# Patient Record
Sex: FEMALE | Race: WHITE | NOT HISPANIC OR LATINO | Employment: UNEMPLOYED | URBAN - METROPOLITAN AREA
[De-identification: names, ages, dates, MRNs, and addresses within clinical notes are randomized per-mention and may not be internally consistent; named-entity substitution may affect disease eponyms.]

---

## 2017-03-02 DIAGNOSIS — Z00.00 ENCOUNTER FOR ANNUAL PHYSICAL EXAM: Primary | ICD-10-CM

## 2017-03-03 LAB
ALBUMIN SERPL-MCNC: 4.1 G/DL (ref 3.5–5.2)
ALBUMIN/GLOB SERPL: 1.6 G/DL
ALP SERPL-CCNC: 35 U/L (ref 40–129)
ALT SERPL-CCNC: 20 U/L (ref 5–33)
AST SERPL-CCNC: 20 U/L (ref 5–32)
BASOPHILS # BLD AUTO: 0.02 10*3/MM3 (ref 0–0.2)
BASOPHILS NFR BLD AUTO: 0.4 % (ref 0–2)
BILIRUB SERPL-MCNC: 0.4 MG/DL (ref 0.2–1.2)
BUN SERPL-MCNC: 12 MG/DL (ref 6–20)
BUN/CREAT SERPL: 11.9 (ref 7–25)
CALCIUM SERPL-MCNC: 8.8 MG/DL (ref 8.6–10.5)
CHLORIDE SERPL-SCNC: 102 MMOL/L (ref 98–107)
CHOLEST SERPL-MCNC: 166 MG/DL (ref 0–200)
CO2 SERPL-SCNC: 24.6 MMOL/L (ref 22–29)
CREAT SERPL-MCNC: 1.01 MG/DL (ref 0.57–1)
EOSINOPHIL # BLD AUTO: 0.25 10*3/MM3 (ref 0.1–0.3)
EOSINOPHIL NFR BLD AUTO: 4.6 % (ref 0–4)
ERYTHROCYTE [DISTWIDTH] IN BLOOD BY AUTOMATED COUNT: 12.5 % (ref 11.5–14.5)
GLOBULIN SER CALC-MCNC: 2.5 GM/DL
GLUCOSE SERPL-MCNC: 88 MG/DL (ref 65–99)
HCT VFR BLD AUTO: 41.9 % (ref 37–47)
HDLC SERPL-MCNC: 58 MG/DL (ref 40–60)
HGB BLD-MCNC: 13.4 G/DL (ref 12–16)
IMM GRANULOCYTES # BLD: 0.01 10*3/MM3 (ref 0–0.03)
IMM GRANULOCYTES NFR BLD: 0.2 % (ref 0–0.5)
LDLC SERPL CALC-MCNC: 86 MG/DL (ref 0–100)
LDLC/HDLC SERPL: 1.48 {RATIO}
LYMPHOCYTES # BLD AUTO: 1.33 10*3/MM3 (ref 0.6–4.8)
LYMPHOCYTES NFR BLD AUTO: 24.4 % (ref 20–45)
MCH RBC QN AUTO: 30.2 PG (ref 27–31)
MCHC RBC AUTO-ENTMCNC: 32 G/DL (ref 31–37)
MCV RBC AUTO: 94.4 FL (ref 81–99)
MONOCYTES # BLD AUTO: 0.36 10*3/MM3 (ref 0–1)
MONOCYTES NFR BLD AUTO: 6.6 % (ref 3–8)
NEUTROPHILS # BLD AUTO: 3.48 10*3/MM3 (ref 1.5–8.3)
NEUTROPHILS NFR BLD AUTO: 63.8 % (ref 45–70)
NRBC BLD AUTO-RTO: 0 /100 WBC (ref 0–0)
PLATELET # BLD AUTO: 250 10*3/MM3 (ref 140–500)
POTASSIUM SERPL-SCNC: 4.5 MMOL/L (ref 3.5–5.2)
PROT SERPL-MCNC: 6.6 G/DL (ref 6–8.5)
RBC # BLD AUTO: 4.44 10*6/MM3 (ref 4.2–5.4)
SODIUM SERPL-SCNC: 139 MMOL/L (ref 136–145)
TRIGL SERPL-MCNC: 111 MG/DL (ref 0–150)
VLDLC SERPL CALC-MCNC: 22.2 MG/DL (ref 7–27)
WBC # BLD AUTO: 5.45 10*3/MM3 (ref 4.8–10.8)

## 2017-03-10 ENCOUNTER — OFFICE VISIT (OUTPATIENT)
Dept: FAMILY MEDICINE CLINIC | Facility: CLINIC | Age: 52
End: 2017-03-10

## 2017-03-10 VITALS
RESPIRATION RATE: 16 BRPM | WEIGHT: 142 LBS | SYSTOLIC BLOOD PRESSURE: 100 MMHG | DIASTOLIC BLOOD PRESSURE: 58 MMHG | TEMPERATURE: 98.5 F | HEART RATE: 81 BPM | OXYGEN SATURATION: 99 % | HEIGHT: 63 IN | BODY MASS INDEX: 25.16 KG/M2

## 2017-03-10 DIAGNOSIS — Z00.00 ANNUAL PHYSICAL EXAM: Primary | ICD-10-CM

## 2017-03-10 PROBLEM — J30.89 PERENNIAL ALLERGIC RHINITIS: Status: ACTIVE | Noted: 2017-03-10

## 2017-03-10 PROCEDURE — 99396 PREV VISIT EST AGE 40-64: CPT | Performed by: PHYSICIAN ASSISTANT

## 2017-03-10 NOTE — PROGRESS NOTES
Subjective   Dina Roche is a 51 y.o. female.   Chief Complaint   Patient presents with   • Annual Exam     Lab review       History of Present Illness     Dina is a 51-year-old female who presents for annual physical examination.  States she is feeling well and has no complaints at today's office visit.  She sees OB/GYN for her gynecological and mammogram needs. Dina any upper respiratory symptoms, chest pain, shortness of air, dizziness, abdominal pain, vision changes, dysuria, or swelling of her ankles.  Bowel movements are daily without dark black tarry stools.  Appetite has been healthy and sleep has been normal.  She does exercise by walking daily.    The following portions of the patient's history were reviewed and updated as appropriate: allergies, current medications, past family history, past medical history, past social history and past surgical history.    Review of Systems   Constitutional: Negative.  Negative for chills, fatigue and fever.   HENT: Negative.  Negative for congestion, dental problem, ear pain, postnasal drip, sinus pressure, sneezing and sore throat.    Eyes: Negative.    Respiratory: Negative.  Negative for cough, shortness of breath and wheezing.    Cardiovascular: Negative.  Negative for chest pain, palpitations and leg swelling.   Gastrointestinal: Negative.  Negative for blood in stool, constipation, diarrhea, nausea and vomiting.   Endocrine: Negative.    Genitourinary: Negative.  Negative for dysuria, frequency, hematuria and urgency.   Musculoskeletal: Negative.    Skin: Negative.    Allergic/Immunologic: Negative.    Neurological: Negative.  Negative for dizziness, light-headedness and headaches.   Hematological: Negative.    Psychiatric/Behavioral: Negative.  Negative for sleep disturbance.   All other systems reviewed and are negative.    Vitals:    03/10/17 1452   BP: 100/58   BP Location: Right arm   Patient Position: Sitting   Cuff Size: Adult   Pulse: 81  "  Resp: 16   Temp: 98.5 °F (36.9 °C)   TempSrc: Oral   SpO2: 99%   Weight: 142 lb (64.4 kg)   Height: 62.5\" (158.8 cm)     Wt Readings from Last 3 Encounters:   03/10/17 142 lb (64.4 kg)   03/09/16 142 lb 14.4 oz (64.8 kg)   02/09/15 139 lb 15.9 oz (63.5 kg)       BP Readings from Last 3 Encounters:   03/10/17 100/58   03/09/16 110/62   02/09/15 110/60     Body mass index is 25.56 kg/(m^2).    No Known Allergies    Objective   Physical Exam   Constitutional: She is oriented to person, place, and time. Vital signs are normal. She appears well-developed and well-nourished.   HENT:   Head: Normocephalic and atraumatic.   Right Ear: Hearing, tympanic membrane, external ear and ear canal normal.   Left Ear: Hearing, tympanic membrane, external ear and ear canal normal.   Nose: Nose normal. Right sinus exhibits no maxillary sinus tenderness and no frontal sinus tenderness. Left sinus exhibits no maxillary sinus tenderness and no frontal sinus tenderness.   Mouth/Throat: Uvula is midline, oropharynx is clear and moist and mucous membranes are normal.   Eyes: Conjunctivae, EOM and lids are normal. Pupils are equal, round, and reactive to light.   Neck: Trachea normal and phonation normal. Neck supple. No edema present. No thyromegaly present.   Cardiovascular: Normal rate, regular rhythm, S1 normal, S2 normal, normal heart sounds and normal pulses.    Pulmonary/Chest: Effort normal and breath sounds normal.   Abdominal: Soft. Normal appearance, normal aorta and bowel sounds are normal. There is no hepatomegaly. There is no tenderness.   Lymphadenopathy:     She has no cervical adenopathy.   Neurological: She is alert and oriented to person, place, and time.   Reflex Scores:       Patellar reflexes are 2+ on the right side and 2+ on the left side.  Skin: Skin is warm, dry and intact.   Psychiatric: She has a normal mood and affect. Her speech is normal and behavior is normal. Judgment and thought content normal. Cognition " and memory are normal.       Assessment/Plan   Dina was seen today for annual exam.    Diagnoses and all orders for this visit:    Annual physical exam      Mrs. Dina Roche was seen in office today for annual physical examination.  I have reviewed her laboratory results with her and have completed her biometric storm.  She is a healthy young lady with a normal physical exam at today's office visit.  I've encouraged her to continue eating healthy and exercising regularly.  She will schedule follow-up appointment in one year.        Orders Only on 03/02/2017   Component Date Value Ref Range Status   • WBC 03/03/2017 5.45  4.80 - 10.80 10*3/mm3 Final   • RBC 03/03/2017 4.44  4.20 - 5.40 10*6/mm3 Final   • Hemoglobin 03/03/2017 13.4  12.0 - 16.0 g/dL Final   • Hematocrit 03/03/2017 41.9  37.0 - 47.0 % Final   • MCV 03/03/2017 94.4  81.0 - 99.0 fL Final   • MCH 03/03/2017 30.2  27.0 - 31.0 pg Final   • MCHC 03/03/2017 32.0  31.0 - 37.0 g/dL Final   • RDW 03/03/2017 12.5  11.5 - 14.5 % Final   • Platelets 03/03/2017 250  140 - 500 10*3/mm3 Final   • Neutrophil Rel % 03/03/2017 63.8  45.0 - 70.0 % Final   • Lymphocyte Rel % 03/03/2017 24.4  20.0 - 45.0 % Final   • Monocyte Rel % 03/03/2017 6.6  3.0 - 8.0 % Final   • Eosinophil Rel % 03/03/2017 4.6* 0.0 - 4.0 % Final   • Basophil Rel % 03/03/2017 0.4  0.0 - 2.0 % Final   • Neutrophils Absolute 03/03/2017 3.48  1.50 - 8.30 10*3/mm3 Final   • Lymphocytes Absolute 03/03/2017 1.33  0.60 - 4.80 10*3/mm3 Final   • Monocytes Absolute 03/03/2017 0.36  0.00 - 1.00 10*3/mm3 Final   • Eosinophils Absolute 03/03/2017 0.25  0.10 - 0.30 10*3/mm3 Final   • Basophils Absolute 03/03/2017 0.02  0.00 - 0.20 10*3/mm3 Final   • Immature Granulocyte Rel % 03/03/2017 0.2  0.0 - 0.5 % Final   • Immature Grans Absolute 03/03/2017 0.01  0.00 - 0.03 10*3/mm3 Final   • nRBC 03/03/2017 0.0  0.0 - 0.0 /100 WBC Final   • Glucose 03/03/2017 88  65 - 99 mg/dL Final   • BUN 03/03/2017 12  6 - 20  mg/dL Final   • Creatinine 03/03/2017 1.01* 0.57 - 1.00 mg/dL Final   • eGFR Non African Am 03/03/2017 58* >60 mL/min/1.73 Final   • eGFR African Am 03/03/2017 71  >60 mL/min/1.73 Final   • BUN/Creatinine Ratio 03/03/2017 11.9  7.0 - 25.0 Final   • Sodium 03/03/2017 139  136 - 145 mmol/L Final   • Potassium 03/03/2017 4.5  3.5 - 5.2 mmol/L Final   • Chloride 03/03/2017 102  98 - 107 mmol/L Final   • Total CO2 03/03/2017 24.6  22.0 - 29.0 mmol/L Final   • Calcium 03/03/2017 8.8  8.6 - 10.5 mg/dL Final   • Total Protein 03/03/2017 6.6  6.0 - 8.5 g/dL Final   • Albumin 03/03/2017 4.10  3.50 - 5.20 g/dL Final   • Globulin 03/03/2017 2.5  gm/dL Final   • A/G Ratio 03/03/2017 1.6  g/dL Final   • Total Bilirubin 03/03/2017 0.4  0.2 - 1.2 mg/dL Final   • Alkaline Phosphatase 03/03/2017 35* 40 - 129 U/L Final   • AST (SGOT) 03/03/2017 20  5 - 32 U/L Final   • ALT (SGPT) 03/03/2017 20  5 - 33 U/L Final   • Total Cholesterol 03/03/2017 166  0 - 200 mg/dL Final   • Triglycerides 03/03/2017 111  0 - 150 mg/dL Final   • HDL Cholesterol 03/03/2017 58  40 - 60 mg/dL Final   • VLDL Cholesterol 03/03/2017 22.2  7 - 27 mg/dL Final   • LDL Cholesterol  03/03/2017 86  0 - 100 mg/dL Final   • LDL/HDL Ratio 03/03/2017 1.48   Final           Dragon transcription disclaimer    Much of this encounter note is an electronic transcription/translation of spoken language to printed text.  The electronic translation of spoken language may permit erroneous, or at times, nonsensical words or phrases to be inadvertently transcribed.  Although I have reviewed the note for such errors, some may still exist.    Catie Leiva PA-C  Family Practice

## 2017-04-18 ENCOUNTER — APPOINTMENT (OUTPATIENT)
Dept: WOMENS IMAGING | Facility: HOSPITAL | Age: 52
End: 2017-04-18

## 2017-04-18 PROCEDURE — 77067 SCR MAMMO BI INCL CAD: CPT | Performed by: RADIOLOGY

## 2017-04-18 PROCEDURE — 77063 BREAST TOMOSYNTHESIS BI: CPT | Performed by: RADIOLOGY

## 2017-09-27 ENCOUNTER — OFFICE VISIT (OUTPATIENT)
Dept: FAMILY MEDICINE CLINIC | Facility: CLINIC | Age: 52
End: 2017-09-27

## 2017-09-27 VITALS
HEART RATE: 83 BPM | WEIGHT: 144 LBS | BODY MASS INDEX: 25.52 KG/M2 | RESPIRATION RATE: 16 BRPM | SYSTOLIC BLOOD PRESSURE: 120 MMHG | DIASTOLIC BLOOD PRESSURE: 72 MMHG | TEMPERATURE: 98.3 F | HEIGHT: 63 IN | OXYGEN SATURATION: 100 %

## 2017-09-27 DIAGNOSIS — M54.50 ACUTE MIDLINE LOW BACK PAIN WITHOUT SCIATICA: Primary | ICD-10-CM

## 2017-09-27 DIAGNOSIS — Z23 NEED FOR IMMUNIZATION AGAINST INFLUENZA: ICD-10-CM

## 2017-09-27 PROCEDURE — 90686 IIV4 VACC NO PRSV 0.5 ML IM: CPT | Performed by: PHYSICIAN ASSISTANT

## 2017-09-27 PROCEDURE — 99213 OFFICE O/P EST LOW 20 MIN: CPT | Performed by: PHYSICIAN ASSISTANT

## 2017-09-27 RX ORDER — METHYLPREDNISOLONE 4 MG/1
TABLET ORAL
Qty: 21 TABLET | Refills: 0 | Status: SHIPPED | OUTPATIENT
Start: 2017-09-27 | End: 2017-11-15 | Stop reason: SDUPTHER

## 2017-09-27 RX ORDER — METHOCARBAMOL 500 MG/1
500 TABLET, FILM COATED ORAL 4 TIMES DAILY
Qty: 60 TABLET | Refills: 0 | Status: SHIPPED | OUTPATIENT
Start: 2017-09-27 | End: 2018-12-21

## 2017-09-27 RX ORDER — MELOXICAM 15 MG/1
15 TABLET ORAL DAILY
Qty: 30 TABLET | Refills: 0 | Status: SHIPPED | OUTPATIENT
Start: 2017-09-27 | End: 2018-12-21

## 2017-09-27 NOTE — PROGRESS NOTES
"Subjective   Dina Roche is a 52 y.o. female.   Chief Complaint   Patient presents with   • Back Pain       History of Present Illness     Dina is a 52-year-old female who presents with low back pain for the past 3 days.  States she was turning in the kitchen on Sunday.  States she \"felt a pop.snap sensation\" in her lower back area.  The pain worsens with movement.  States she has had back issues in the past.  Dina has slight scoliosis in her lower spine.  She has injured her back in the past when she slipped once and fell.  She has used OTC salon pas,IBU and heating pad with no relief.  She applied ICE yesterday which helped.  Bowel movements are every other day. Denied any dark tarry stools.  Denied any stool/urine incontinence. Dina has taken OTC probiotic daily.  Sleep has been restless due to back pain.  She has been sleeping with a pillow between legs.  Healthy diet.  Dina would like the flu shot at today's office visit.      The following portions of the patient's history were reviewed and updated as appropriate: allergies, current medications, past family history, past medical history, past social history and past surgical history.    Review of Systems   Constitutional: Negative.    HENT: Negative.    Eyes: Negative.    Respiratory: Negative.  Negative for cough, shortness of breath and wheezing.    Cardiovascular: Negative.  Negative for chest pain, palpitations and leg swelling.   Gastrointestinal: Negative.  Negative for abdominal pain, constipation, diarrhea, nausea and vomiting.   Endocrine: Negative.    Genitourinary: Negative.    Musculoskeletal: Positive for back pain.   Skin: Negative.    Allergic/Immunologic: Negative.    Neurological: Negative.    Hematological: Negative.    Psychiatric/Behavioral: Positive for sleep disturbance. Negative for suicidal ideas.   All other systems reviewed and are negative.    Vitals:    09/27/17 1031   BP: 120/72   BP Location: Left arm " "  Patient Position: Sitting   Cuff Size: Adult   Pulse: 83   Resp: 16   Temp: 98.3 °F (36.8 °C)   TempSrc: Oral   SpO2: 100%   Weight: 144 lb (65.3 kg)   Height: 62.5\" (158.8 cm)       Wt Readings from Last 3 Encounters:   09/27/17 144 lb (65.3 kg)   03/10/17 142 lb (64.4 kg)   03/09/16 142 lb 14.4 oz (64.8 kg)       BP Readings from Last 3 Encounters:   09/27/17 120/72   03/10/17 100/58   03/09/16 110/62     Body mass index is 25.92 kg/(m^2).  No Known Allergies    Objective   Physical Exam   Constitutional: She is oriented to person, place, and time. Vital signs are normal. She appears well-developed and well-nourished.   Neck: Trachea normal and phonation normal. Neck supple. No edema present.   Cardiovascular: Normal rate, regular rhythm, S1 normal, S2 normal, normal heart sounds and normal pulses.    Pulmonary/Chest: Effort normal and breath sounds normal.   Abdominal: Soft. Normal appearance and bowel sounds are normal. There is no hepatomegaly. There is no tenderness.   Musculoskeletal:        Lumbar back: She exhibits decreased range of motion, tenderness, pain and spasm. She exhibits no bony tenderness and normal pulse.        Back:    Low back:  TTP along bilateral lumbar spinous muscles.  Decreased ROM in all fields.  Good heel/toe walk,5/5 B/L muscle strength,2+ distal pulses,2+DTR and negative SLR noted.   Neurological: She is alert and oriented to person, place, and time. She has normal strength. No sensory deficit.   Reflex Scores:       Patellar reflexes are 2+ on the right side and 2+ on the left side.  Skin: Skin is warm, dry and intact.   Psychiatric: She has a normal mood and affect. Her speech is normal and behavior is normal. Judgment and thought content normal. Cognition and memory are normal.       Assessment/Plan   Dina was seen today for back pain.    Diagnoses and all orders for this visit:    Acute midline low back pain without sciatica  -     methocarbamol (ROBAXIN) 500 MG tablet; " Take 1 tablet by mouth 4 (Four) Times a Day.  -     MethylPREDNISolone (MEDROL, LENA,) 4 MG tablet; Take as directed on package instructions.  -     meloxicam (MOBIC) 15 MG tablet; Take 1 tablet by mouth Daily.    Need for immunization against influenza  -     Flu Vaccine Quad PF 3YR+      1.  New acute midline low back pain without sciatica: I have prescribed Robaxin,Mobic and a Medrol Dosepak to pharmacy.  I have encouraged Dina to continue ice treatment at home as needed.  I have given her exercises to do daily.  I've encouraged her to continue sleeping with a pillow between legs. Dina return to office if no improvement.  I'll consider imaging studies and physical therapy in future if no improvement.  2.  Need for influenza immunization: She has given written consent and will receive the flu shot at today's office visit.        Dragon transcription disclaimer    Much of this encounter note is an electronic transcription/translation of spoken language to printed text.  The electronic translation of spoken language may permit erroneous, or at times, nonsensical words or phrases to be inadvertently transcribed.  Although I have reviewed the note for such errors, some may still exist.    Catie Leiva PA-C  Family Practice

## 2017-09-27 NOTE — PATIENT INSTRUCTIONS
Back Exercises  If you have pain in your back, do these exercises 2-3 times each day or as told by your doctor. When the pain goes away, do the exercises once each day, but repeat the steps more times for each exercise (do more repetitions). If you do not have pain in your back, do these exercises once each day or as told by your doctor.  EXERCISES  Single Knee to Chest  Do these steps 3-5 times in a row for each le. Lie on your back on a firm bed or the floor with your legs stretched out.  2. Bring one knee to your chest.  3. Hold your knee to your chest by grabbing your knee or thigh.  4. Pull on your knee until you feel a gentle stretch in your lower back.  5. Keep doing the stretch for 10-30 seconds.  6. Slowly let go of your leg and straighten it.  Pelvic Tilt  Do these steps 5-10 times in a row:  1. Lie on your back on a firm bed or the floor with your legs stretched out.  2. Bend your knees so they point up to the ceiling. Your feet should be flat on the floor.  3. Tighten your lower belly (abdomen) muscles to press your lower back against the floor. This will make your tailbone point up to the ceiling instead of pointing down to your feet or the floor.  4. Stay in this position for 5-10 seconds while you gently tighten your muscles and breathe evenly.  Cat-Cow  Do these steps until your lower back bends more easily:  1. Get on your hands and knees on a firm surface. Keep your hands under your shoulders, and keep your knees under your hips. You may put padding under your knees.  2. Let your head hang down, and make your tailbone point down to the floor so your lower back is round like the back of a cat.  3. Stay in this position for 5 seconds.  4. Slowly lift your head and make your tailbone point up to the ceiling so your back hangs low (sags) like the back of a cow.  5. Stay in this position for 5 seconds.  Press-Ups  Do these steps 5-10 times in a row:  1. Lie on your belly (face-down) on the  floor.  2. Place your hands near your head, about shoulder-width apart.  3. While you keep your back relaxed and keep your hips on the floor, slowly straighten your arms to raise the top half of your body and lift your shoulders. Do not use your back muscles. To make yourself more comfortable, you may change where you place your hands.  4. Stay in this position for 5 seconds.  5. Slowly return to lying flat on the floor.  Bridges  Do these steps 10 times in a row:  1. Lie on your back on a firm surface.  2. Bend your knees so they point up to the ceiling. Your feet should be flat on the floor.  3. Tighten your butt muscles and lift your butt off of the floor until your waist is almost as high as your knees. If you do not feel the muscles working in your butt and the back of your thighs, slide your feet 1-2 inches farther away from your butt.  4. Stay in this position for 3-5 seconds.  5. Slowly lower your butt to the floor, and let your butt muscles relax.  If this exercise is too easy, try doing it with your arms crossed over your chest.  Belly Crunches  Do these steps 5-10 times in a row:  1. Lie on your back on a firm bed or the floor with your legs stretched out.  2. Bend your knees so they point up to the ceiling. Your feet should be flat on the floor.  3. Cross your arms over your chest.  4. Tip your chin a little bit toward your chest but do not bend your neck.  5. Tighten your belly muscles and slowly raise your chest just enough to lift your shoulder blades a tiny bit off of the floor.  6. Slowly lower your chest and your head to the floor.  Back Lifts  Do these steps 5-10 times in a row:  1. Lie on your belly (face-down) with your arms at your sides, and rest your forehead on the floor.  2. Tighten the muscles in your legs and your butt.  3. Slowly lift your chest off of the floor while you keep your hips on the floor. Keep the back of your head in line with the curve in your back. Look at the floor while  you do this.  4. Stay in this position for 3-5 seconds.  5. Slowly lower your chest and your face to the floor.  GET HELP IF:  · Your back pain gets a lot worse when you do an exercise.  · Your back pain does not lessen 2 hours after you exercise.  If you have any of these problems, stop doing the exercises. Do not do them again unless your doctor says it is okay.  GET HELP RIGHT AWAY IF:  · You have sudden, very bad back pain. If this happens, stop doing the exercises. Do not do them again unless your doctor says it is okay.     This information is not intended to replace advice given to you by your health care provider. Make sure you discuss any questions you have with your health care provider.     Document Released: 01/20/2012 Document Revised: 04/10/2017 Document Reviewed: 02/11/2016  [employer]vier Interactive Patient Education ©2017 [employer]

## 2017-11-15 ENCOUNTER — TELEPHONE (OUTPATIENT)
Dept: FAMILY MEDICINE CLINIC | Facility: CLINIC | Age: 52
End: 2017-11-15

## 2017-11-15 DIAGNOSIS — M54.50 ACUTE MIDLINE LOW BACK PAIN WITHOUT SCIATICA: ICD-10-CM

## 2017-11-15 RX ORDER — METHYLPREDNISOLONE 4 MG/1
TABLET ORAL
Qty: 21 TABLET | Refills: 0 | Status: SHIPPED | OUTPATIENT
Start: 2017-11-15 | End: 2018-12-21

## 2017-11-15 NOTE — TELEPHONE ENCOUNTER
Notify  patient have sent a Medrol Dosepak to pharmacy.  Please keep appointment for dry needling.  If no improvement please schedule follow-up appointment.

## 2017-11-16 ENCOUNTER — TREATMENT (OUTPATIENT)
Dept: PHYSICAL THERAPY | Facility: CLINIC | Age: 52
End: 2017-11-16

## 2017-11-16 PROCEDURE — DRYNDL PR CUSTOM DRY NEEDLING SELF PAY: Performed by: PHYSICAL THERAPIST

## 2017-11-16 NOTE — PROGRESS NOTES
Physical Therapy Initial Evaluation and Plan of Care        Patient: Dina Roche   : 1965  Diagnosis/ICD-10 Code:  No primary diagnosis found.  Referring practitioner: No ref. provider found  Date of Initial Visit: 2017  Today's Date: 2017  Patient seen for 1 sessions                 Subjective Evaluation    History of Present Illness  Date of onset: 2017  Mechanism of injury: Occasional low back soreness - twisted to reach and felt a snap in my low back and went to [facility]- medrol DP, muscle relaxor, and meloxicam - significantly better within a couple days - came back 10/10/17 started back on muscle relaxor and NSAIDS have not helped - prescribed medrol DP yesterday       Patient Occupation:   Quality of life: excellent    Pain  Current pain ratin  At best pain ratin  At worst pain ratin  Location: center to right side low back   Quality: dull ache and sharp (nagging)  Relieving factors: ice and heat  Aggravating factors: stairs, ambulation and movement  Progression: no change    Social Support  Lives in: multiple-level home    Treatments  Previous treatment: medication  Current treatment: medication and physical therapy  Patient Goals  Patient goals for therapy: decreased pain, improved balance, increased motion, increased strength, independence with ADLs/IADLs, return to sport/leisure activities and return to work             Objective     Special Questions      Additional Special Questions  + cough/ sneeze      Postural Observations    Additional Postural Observation Details  = Iliac crest and ASIS in supine    Palpation     Right Tenderness of the quadratus lumborum.     Tenderness     Right Hip   Tenderness in the PSIS.     Neurological Testing   Sensation     Lumbar   Left   Intact: light touch    Right   Intact: light touch    Active Range of Motion     Lumbar   Flexion: 50 degrees with pain  Extension: 25 degrees with pain  Left lateral flexion: 75 degrees    Right lateral flexion: 50 degrees with pain  Left rotation: 75 degrees   Right rotation: 50 degrees with pain    Strength/Myotome Testing     Left Hip   Planes of Motion   Flexion: 5  Abduction: 5    Right Hip   Planes of Motion   Flexion: 5  Abduction: 5    Left Knee   Flexion: 5  Extension: 5    Right Knee   Flexion: 5  Extension: 5    Left Ankle/Foot   Dorsiflexion: 5  Plantar flexion: 5    Right Ankle/Foot   Dorsiflexion: 5  Plantar flexion: 5    Tests       Thoracic   Negative slump.     Lumbar     Left   Negative crossed SLR and passive SLR.     Right   Positive crossed SLR.   Negative passive SLR.     Additional Tests Details  Negative : B ZEFERINO         Assessment & Plan     Assessment  Assessment details: Informed and signed consent obtained for Dry Needling. Educated patient on purpose and procedure. Improved subjective reports and muscle tone after Dry Needling attempts. Cont PT 1x per week or as indicated by patient for Dry Needling.         Manual Therapy:         mins  87305;  Therapeutic Exercise:         mins  65653;     Neuromuscular Gabe:        mins  74108;    Therapeutic Activity:          mins  24584;     Gait Training:          mins  41232;     Ultrasound:      mins  42425;    Electrical Stimulation:         mins  47873 ( );  Dry Needling     15     mins self-pay    Timed Treatment:      mins   Total Treatment:     25   mins    PT SIGNATURE: Marce Robertson, PT   DATE TREATMENT INITIATED: 11/16/2017    Initial Certification  Certification Period: 2/14/2018  I certify that the therapy services are furnished while this patient is under my care.  The services outlined above are required by this patient, and will be reviewed every 90 days.     PHYSICIAN:       DATE:     Please sign and return via fax to [phone].. Thank you, [facility].

## 2018-04-24 ENCOUNTER — APPOINTMENT (OUTPATIENT)
Dept: WOMENS IMAGING | Facility: HOSPITAL | Age: 53
End: 2018-04-24

## 2018-04-24 PROCEDURE — 77063 BREAST TOMOSYNTHESIS BI: CPT | Performed by: RADIOLOGY

## 2018-04-24 PROCEDURE — 77067 SCR MAMMO BI INCL CAD: CPT | Performed by: RADIOLOGY

## 2018-05-11 ENCOUNTER — CLINICAL SUPPORT (OUTPATIENT)
Dept: FAMILY MEDICINE CLINIC | Facility: CLINIC | Age: 53
End: 2018-05-11

## 2018-05-11 DIAGNOSIS — Z23 NEED FOR DIPHTHERIA, TETANUS, ACELLULAR PERTUSSIS, HAEMOPHILUS INFLUENZAE, AND HEPATITIS B VIRUS VACCINE: Primary | ICD-10-CM

## 2018-05-11 PROCEDURE — 90632 HEPA VACCINE ADULT IM: CPT | Performed by: PHYSICIAN ASSISTANT

## 2018-05-11 PROCEDURE — 90471 IMMUNIZATION ADMIN: CPT | Performed by: PHYSICIAN ASSISTANT

## 2018-09-19 ENCOUNTER — APPOINTMENT (OUTPATIENT)
Dept: WOMENS IMAGING | Facility: HOSPITAL | Age: 53
End: 2018-09-19

## 2018-09-19 PROCEDURE — 76641 ULTRASOUND BREAST COMPLETE: CPT | Performed by: RADIOLOGY

## 2018-09-19 PROCEDURE — G0279 TOMOSYNTHESIS, MAMMO: HCPCS | Performed by: RADIOLOGY

## 2018-09-19 PROCEDURE — 77065 DX MAMMO INCL CAD UNI: CPT | Performed by: RADIOLOGY

## 2018-09-19 PROCEDURE — 77061 BREAST TOMOSYNTHESIS UNI: CPT | Performed by: RADIOLOGY

## 2018-10-08 ENCOUNTER — FLU SHOT (OUTPATIENT)
Dept: FAMILY MEDICINE CLINIC | Facility: CLINIC | Age: 53
End: 2018-10-08

## 2018-10-08 DIAGNOSIS — Z23 FLU VACCINE NEED: ICD-10-CM

## 2018-10-08 PROCEDURE — 90471 IMMUNIZATION ADMIN: CPT | Performed by: PHYSICIAN ASSISTANT

## 2018-10-08 PROCEDURE — 90674 CCIIV4 VAC NO PRSV 0.5 ML IM: CPT | Performed by: PHYSICIAN ASSISTANT

## 2018-11-12 ENCOUNTER — TREATMENT (OUTPATIENT)
Dept: PHYSICAL THERAPY | Facility: CLINIC | Age: 53
End: 2018-11-12

## 2018-11-12 DIAGNOSIS — M25.521 RIGHT ELBOW PAIN: Primary | ICD-10-CM

## 2018-11-12 PROCEDURE — DRYNDL PR CUSTOM DRY NEEDLING SELF PAY: Performed by: PHYSICAL THERAPIST

## 2018-11-12 NOTE — PROGRESS NOTES
Physical Therapy Initial Evaluation and Plan of Care    Patient: Dina Roche   : 1965  Diagnosis/ICD-10 Code:  Right elbow pain [M25.521]  Referring practitioner: No ref. provider found  Date of Initial Visit: 2018  Today's Date: 2018  Patient seen for 1 sessions                 Subjective Evaluation    History of Present Illness  Date of onset: 2018  Mechanism of injury: Stained deck and work 2 days a week using computer - always increases pain       Patient Occupation: desk work  Quality of life: excellent    Pain  Current pain rating: 3  At best pain ratin  At worst pain ratin  Location: right elbow   Quality: dull ache and discomfort  Relieving factors: rest and medications (ibuprofen)  Aggravating factors: lifting, movement, prolonged positioning, repetitive movement and overhead activity  Progression: worsening    Hand dominance: right    Diagnostic Tests  No diagnostic tests performed    Treatments  Previous treatment: medication  Current treatment: physical therapy  Patient Goals  Patient goals for therapy: decreased edema, decreased pain, increased motion, independence with ADLs/IADLs, return to sport/leisure activities and return to work             Objective       Palpation     Right   Hypertonic in the brachialis, brachioradialis, supinator, triceps, wrist extensors and wrist flexors.          Assessment & Plan     Assessment  Assessment details: Informed and signed consent for Dry Needling obtained. Patient educated in purpose and procedure. Requested patient to apply moist heat again tonight. Improved subjective reports, mobility, and muscle guarding after Dry Needling attempts. Cont PT 1x per week or as indicated by patient for Dry Needling.           Manual Therapy:         mins  21419;  Therapeutic Exercise:         mins  14896;     Neuromuscular Gabe:       mins  67584;    Therapeutic Activity:          mins  96953;     Gait Training:           mins  18306;      Ultrasound:          mins  57555;    Electrical Stimulation:         mins  99071 ( );  Dry Needling     15     mins self-pay    Timed Treatment:      mins   Total Treatment:     25   mins    PT SIGNATURE: Marce Robertson, PT   DATE TREATMENT INITIATED: 11/12/2018    Initial Certification  Certification Period: 2/10/2019  I certify that the therapy services are furnished while this patient is under my care.  The services outlined above are required by this patient, and will be reviewed every 90 days.     PHYSICIAN:       DATE:     Please sign and return via fax to [phone].. Thank you, [facility].

## 2018-11-19 ENCOUNTER — TREATMENT (OUTPATIENT)
Dept: PHYSICAL THERAPY | Facility: CLINIC | Age: 53
End: 2018-11-19

## 2018-11-19 DIAGNOSIS — M25.521 RIGHT ELBOW PAIN: Primary | ICD-10-CM

## 2018-11-19 PROCEDURE — DRYNDL PR CUSTOM DRY NEEDLING SELF PAY: Performed by: PHYSICAL THERAPIST

## 2018-11-19 NOTE — PROGRESS NOTES
Physical Therapy Daily Progress Note        Visit # : 2  Dina Roche reports: Elbow did feel like it got worse before it got better     Subjective     Objective       Palpation     Right   Hypertonic in the brachialis, brachioradialis, supinator, triceps, wrist extensors and wrist flexors.      See Exercise, Manual, and Modality Logs for complete treatment.       Assessment & Plan     Assessment  Assessment details: Patient presents with increased muscle tone right lateral and medial epi. Tolerated Dry Needling well today. Improved mobility and subjective reports after Dry Needling. Educated patient to apply moist heat again tonight.           Progress per Plan of Care           Manual Therapy:         mins  12612;  Therapeutic Exercise:        mins  01593;     Neuromuscular Gabe:        mins  15937;    Therapeutic Activity:          mins  14729;     Gait Training:          mins  17453;     Ultrasound:          mins  62482;    Electrical Stimulation:         mins  93557 ( );  Dry Needling     15     mins self-pay    Timed Treatment:      mins   Total Treatment:     25   mins    Marce Robertson, PT  Physical Therapist  [place] License # [id number]

## 2018-11-29 ENCOUNTER — TREATMENT (OUTPATIENT)
Dept: PHYSICAL THERAPY | Facility: CLINIC | Age: 53
End: 2018-11-29

## 2018-11-29 DIAGNOSIS — M25.521 RIGHT ELBOW PAIN: Primary | ICD-10-CM

## 2018-11-29 PROCEDURE — DRYNDL PR CUSTOM DRY NEEDLING SELF PAY: Performed by: PHYSICAL THERAPIST

## 2018-11-29 NOTE — PROGRESS NOTES
Physical Therapy Daily Progress Note        Visit # : 3  Dina Roche reports: I have had a lot more pain and stiffness over the past couple of days    Subjective     Objective       Palpation     Right   Hypertonic in the brachialis, pronator teres and wrist flexors.     Tests     Left Elbow   Positive moving valgus stress, passive medial epicondylitis and valgus stress at 30 degrees.      See Exercise, Manual, and Modality Logs for complete treatment.       Assessment & Plan     Assessment  Assessment details: Patient presents with increased subjective reports of pain and increased muscle tone right medial epi. Tolerated Dry Needling well today. Improved mobility and subjective reports after Dry Needling. Educated patient to apply moist heat again tonight. Patient to call Ortho if no improvement after today's treatment.           Progress per Plan of Care           Manual Therapy:         mins  19103;  Therapeutic Exercise:         mins  06010;     Neuromuscular Gabe:        mins  18116;    Therapeutic Activity:         mins  27530;     Gait Training:           mins  54236;     Ultrasound:          mins  22434;    Electrical Stimulation:         mins  60023 ( );  Dry Needling     15     mins self-pay    Timed Treatment:      mins   Total Treatment:     25   mins    Marce Robertson PT  Physical Therapist  [place] License # [id number]

## 2018-12-21 ENCOUNTER — OFFICE VISIT (OUTPATIENT)
Dept: ORTHOPEDIC SURGERY | Facility: CLINIC | Age: 53
End: 2018-12-21

## 2018-12-21 VITALS — HEIGHT: 63 IN | WEIGHT: 140 LBS | BODY MASS INDEX: 24.8 KG/M2 | TEMPERATURE: 98.3 F

## 2018-12-21 DIAGNOSIS — M77.11 LATERAL EPICONDYLITIS OF RIGHT ELBOW: ICD-10-CM

## 2018-12-21 DIAGNOSIS — M25.521 RIGHT ELBOW PAIN: Primary | ICD-10-CM

## 2018-12-21 PROCEDURE — 73070 X-RAY EXAM OF ELBOW: CPT | Performed by: ORTHOPAEDIC SURGERY

## 2018-12-21 PROCEDURE — 20605 DRAIN/INJ JOINT/BURSA W/O US: CPT | Performed by: ORTHOPAEDIC SURGERY

## 2018-12-21 PROCEDURE — 99204 OFFICE O/P NEW MOD 45 MIN: CPT | Performed by: ORTHOPAEDIC SURGERY

## 2018-12-21 RX ORDER — LIDOCAINE HYDROCHLORIDE 20 MG/ML
3 INJECTION, SOLUTION EPIDURAL; INFILTRATION; INTRACAUDAL; PERINEURAL
Status: COMPLETED | OUTPATIENT
Start: 2018-12-21 | End: 2018-12-21

## 2018-12-21 RX ORDER — METHYLPREDNISOLONE ACETATE 80 MG/ML
80 INJECTION, SUSPENSION INTRA-ARTICULAR; INTRALESIONAL; INTRAMUSCULAR; SOFT TISSUE
Status: COMPLETED | OUTPATIENT
Start: 2018-12-21 | End: 2018-12-21

## 2018-12-21 RX ORDER — MELOXICAM 15 MG/1
15 TABLET ORAL DAILY
Qty: 30 TABLET | Refills: 3 | Status: SHIPPED | OUTPATIENT
Start: 2018-12-21 | End: 2019-01-25

## 2018-12-21 RX ADMIN — METHYLPREDNISOLONE ACETATE 80 MG: 80 INJECTION, SUSPENSION INTRA-ARTICULAR; INTRALESIONAL; INTRAMUSCULAR; SOFT TISSUE at 09:24

## 2018-12-21 RX ADMIN — LIDOCAINE HYDROCHLORIDE 3 ML: 20 INJECTION, SOLUTION EPIDURAL; INFILTRATION; INTRACAUDAL; PERINEURAL at 09:24

## 2018-12-21 NOTE — PROGRESS NOTES
New right Elbow      Patient: Dina Roche        YOB: 1965        Chief Complaints:right Elbow pain  No chief complaint on file.        History of Present Illness:  This is a  53 y.o. patient who is right-hand-dominant presents claimed right elbow pain is been ongoing for several months she states it started in the wrist and then progressed elbow pain she was sealing her deck and feels like that was the culprit she has significant pain the lateral aspect of the elbow she is a  and is on the computer all day for at least 2 days a week.  Her symptoms are moderate intermittent aching with swelling worse with activity better with rest past medical history is unremarkable 14 point review of systems are remarkable for pertinent positives listed in the chart by the patient the remainder are negative          Allergies: No Known Allergies    Medications:   Home Medications:  Current Outpatient Medications on File Prior to Visit   Medication Sig   • meloxicam (MOBIC) 15 MG tablet Take 1 tablet by mouth Daily.   • methocarbamol (ROBAXIN) 500 MG tablet Take 1 tablet by mouth 4 (Four) Times a Day.   • MethylPREDNISolone (MEDROL, LENA,) 4 MG tablet Take as directed on package instructions.   • Norethin-Eth Estrad-Fe Biphas 1 MG-10 MCG / 10 MCG tablet Take  by mouth.   • Probiotic capsule Take  by mouth.   • Pseudoephedrine-Guaifenesin -1200 MG tablet sustained-release 12 hour Take 120 mg by mouth every 12 (twelve) hours.     No current facility-administered medications on file prior to visit.      Current Medications:  Scheduled Meds:  Continuous Infusions:  No current facility-administered medications for this visit.   PRN Meds:.    No past medical history on file.   No past surgical history on file.     Social History     Occupational History   • Not on file   Tobacco Use   • Smoking status: Never Smoker   Substance and Sexual Activity   • Alcohol use: Not on file   • Drug use: Not on file    • Sexual activity: Not on file    Social History     Social History Narrative   • Not on file      No family history on file.          Review of Systems: 14 point review of systems remarkable for the pertinent positives listed in chart by the patient the remainder are negative  Review of Systems      Physical Exam: 53 y.o. female  General Appearance:    Alert, cooperative, in no acute distress                 There were no vitals filed for this visit.   Patient is alert and read ×3 no acute distress appears her above-listed at height weight and age.  Affect is normal respiratory rate is normal unlabored. Heart rate regular rate rhythm, sclera, dentition and hearing are normal for the purpose of this exam.        Ortho Exam  .exam         Physical exam of the right elbow reveals no effusion no redness.  The patient has full range of motion.  They have tenderness over the lateral condyle.  There pain with resisted wrist extension no pain with passive wrist flexion.  They have a negative Tinel's.  Have no overlying skin changes no lymphedema no lymphadenopathy.  Good distal pulses  Radiology:   AP, Lateral of the  right elbow were ordered/reviewed to evaluate pain.  I've no comparative films these are normal she has a little tiny osteophyte off the lateral epicondyles nothing that looks acute      Assessment/Plan:    Right lateral epicondylitis I think she benefit from an injection she's been doing physical therapy dry needling we will proceed with an injection I will also start her on Modic with strict precautions for short period of time I will give her Pennsaid cream and give her a strap.  She does have a cock-up splint.  I'll see her back in 4-5 weeks if she fails to improve we will pursue other means of testing              Medium Joint Arthrocentesis: R elbow  Date/Time: 12/21/2018 9:24 AM  Consent given by: patient  Site marked: site marked  Timeout: Immediately prior to procedure a time out was called to  verify the correct patient, procedure, equipment, support staff and site/side marked as required   Supporting Documentation  Indications: pain   Procedure Details  Location: elbow - R elbow  Preparation: Patient was prepped and draped in the usual sterile fashion  Needle size: 22 G  Medications administered: 3 mL lidocaine PF 2% 2 %; 80 mg methylPREDNISolone acetate 80 MG/ML  Patient tolerance: patient tolerated the procedure well with no immediate complications

## 2019-01-25 ENCOUNTER — OFFICE VISIT (OUTPATIENT)
Dept: ORTHOPEDIC SURGERY | Facility: CLINIC | Age: 54
End: 2019-01-25

## 2019-01-25 VITALS — BODY MASS INDEX: 24.8 KG/M2 | TEMPERATURE: 97.4 F | HEIGHT: 63 IN | WEIGHT: 140 LBS

## 2019-01-25 DIAGNOSIS — M77.11 LATERAL EPICONDYLITIS OF RIGHT ELBOW: Primary | ICD-10-CM

## 2019-01-25 PROCEDURE — 99212 OFFICE O/P EST SF 10 MIN: CPT | Performed by: ORTHOPAEDIC SURGERY

## 2019-02-11 ENCOUNTER — CLINICAL SUPPORT (OUTPATIENT)
Dept: FAMILY MEDICINE CLINIC | Facility: CLINIC | Age: 54
End: 2019-02-11

## 2019-02-11 DIAGNOSIS — Z23 NEED FOR DIPHTHERIA, TETANUS, ACELLULAR PERTUSSIS, HAEMOPHILUS INFLUENZAE, AND HEPATITIS B VIRUS VACCINE: Primary | ICD-10-CM

## 2019-02-11 PROCEDURE — 90471 IMMUNIZATION ADMIN: CPT | Performed by: PHYSICIAN ASSISTANT

## 2019-02-11 PROCEDURE — 90632 HEPA VACCINE ADULT IM: CPT | Performed by: PHYSICIAN ASSISTANT

## 2019-02-15 ENCOUNTER — TELEPHONE (OUTPATIENT)
Dept: ORTHOPEDIC SURGERY | Facility: CLINIC | Age: 54
End: 2019-02-15

## 2019-03-25 ENCOUNTER — OUTSIDE FACILITY SERVICE (OUTPATIENT)
Dept: SURGERY | Facility: CLINIC | Age: 54
End: 2019-03-25

## 2019-03-25 PROCEDURE — 45378 DIAGNOSTIC COLONOSCOPY: CPT | Performed by: SURGERY

## 2019-06-17 ENCOUNTER — TELEPHONE (OUTPATIENT)
Dept: ORTHOPEDIC SURGERY | Facility: CLINIC | Age: 54
End: 2019-06-17

## 2019-06-18 DIAGNOSIS — M77.11 LATERAL EPICONDYLITIS OF RIGHT ELBOW: Primary | ICD-10-CM

## 2019-06-26 ENCOUNTER — TREATMENT (OUTPATIENT)
Dept: PHYSICAL THERAPY | Facility: CLINIC | Age: 54
End: 2019-06-26

## 2019-06-26 DIAGNOSIS — M25.521 RIGHT ELBOW PAIN: Primary | ICD-10-CM

## 2019-06-26 DIAGNOSIS — R68.89 IMPAIRED FUNCTION OF UPPER EXTREMITY: ICD-10-CM

## 2019-06-26 PROCEDURE — 97161 PT EVAL LOW COMPLEX 20 MIN: CPT | Performed by: PHYSICAL THERAPIST

## 2019-06-26 PROCEDURE — DRYNDL PR CUSTOM DRY NEEDLING SELF PAY: Performed by: PHYSICAL THERAPIST

## 2019-06-26 PROCEDURE — 97035 APP MDLTY 1+ULTRASOUND EA 15: CPT | Performed by: PHYSICAL THERAPIST

## 2019-06-26 PROCEDURE — 97110 THERAPEUTIC EXERCISES: CPT | Performed by: PHYSICAL THERAPIST

## 2019-06-26 NOTE — PROGRESS NOTES
Physical Therapy Initial Evaluation and Plan of Care    Patient: Dina Roche   : 1965  Diagnosis/ICD-10 Code:  Right elbow pain [M25.521]  Referring practitioner: Ellie Santo MD    Subjective Evaluation    History of Present Illness  Date of onset: 2018  Mechanism of injury: I originally injured elbow last August staining my deck and tried some dry needling.  Went to ortho in December and had a cortisone injection and that helped until about 2 months ago.  Works as a  2 days a week and reaching, brushing hair, heavy household chores, writing aggravates symptoms.  Has been sleeping in a wrist splint per PT suggestion.    I sleep with my arm bent and I wake up with a stiff elbow.  I was given Pennsaid but it doesn't seem to help that much.  I take Meloxicam only work days because I don't want take meds every day.    I have avoided working out since I hurt myself and want to be able to get back to those activities.       Pain  Current pain ratin  At best pain ratin (with rest)  At worst pain ratin  Location: R lateral elbow/olecranon region  Quality: discomfort  Relieving factors: rest, support and medications  Aggravating factors: outstretched reach, movement and keyboarding    Hand dominance: right    Treatments  Previous treatment: injection treatment (dry needling)  Patient Goals  Patient goals for therapy: decreased pain, return to sport/leisure activities and increased strength             Objective       Palpation     Right Tenderness of the wrist extensors.     Additional Palpation Details  R anconeus tenderness    Tenderness     Right Elbow   Tenderness in the lateral epicondyle.     Right Wrist/Hand   Tenderness in the lateral epicondyle.     Active Range of Motion     Right Elbow   Flexion: WFL  Extension: Right elbow active extension: sore/tight. WFL  Forearm supination: WFL  Forearm pronation: WFL    Right Wrist   Wrist flexion: WFL  Wrist extension: WFl and  with pain  Radial deviation: WFL  Ulnar deviation: WFL    Strength/Myotome Testing     Right Elbow   Flexion: 5  Extension: 5  Forearm supination: 5 (pain)  Forearm pronation: 5    Left Wrist/Hand      (2nd hand position)     Trial 1: 52 lbs    Trial 2: 48 lbs    Trial 3: 41 lbs    Average: 47 lbs    Right Wrist/Hand   Wrist extension: 5 (pain)  Wrist flexion: 5  Radial deviation: 5  Ulnar deviation: 5 (pain)     (2nd hand position)     Trial 1: 45 lbs    Trial 2: 42 lbs    Trial 3: 41 lbs    Average: 42.67 lbs    Tests     Right Wrist/Hand   Negative resisted middle finger.     Additional Tests Details  + tennis elbow         Assessment & Plan     Assessment  Impairments: activity intolerance and pain with function  Assessment details: Dina Roche is a pleasant 53 y.o. female that presents presents with signs and symptoms consistent with R lateral elbow pain.  She presents with quick DASH 23%, + tennis elbow signs, pain with AROM R wrist/elbow, decreased  strength, pain with resisted wrist ext/UD and elbow supination as well as palpable tenderness. Pt would benefit from skilled PT services in order to address listed impairments and increase tolerance to normal daily activities including ADLs, work and recreational activities.     On this date patient stated that she would like to undergo a dry needling procedure for the soft tissue dysfunction. Patient was educated on the procedure for dry needling and consent waiver is on file. Patient was informed of the risks, possible adverse effects, along with the benefits of TDN. All needles were inserted before first needle was removed. Pt tolerated treatment well but noted increased soreness with wrist and elbow movement following procedure.  Pt educated to avoid anti-inflammatories today and to use MH this evening.             Prognosis: good  Functional Limitations: uncomfortable because of pain  Goals  Plan Goals: STG: in 2 weeks  1. Patient to be  compliant with HEP.  2. Pt to report decreased pain in the (R) elbow of < 4/10 at worst.  3. Pt no longer reporting pain with full elbow extension  4. Pt. to report decreased stiffness by at least 50% upon awakening.    LTG : in 6 weeks  1. DASH score of < 15 % perceived disability.  2. Pt to report decreased pain in the (R) elbow of < 2/10 pain at worst with all heavy household tasks and workout as   3. Pt to exhibit non painful R wrist/elbow ROM in order to tolerate return to light UE workout with weights without functional limitation or pain  4. Pt to exhibit 5/5 and non painful wrist and elbow strength in order to work at computer and perform heavy household chores without complaints of pain limiting function.  5. Minimal palp tenderness R lateral elbow.           Plan  Therapy options: will be seen for skilled physical therapy services  Planned modality interventions: ultrasound, cryotherapy, electrical stimulation/Russian stimulation and thermotherapy (hydrocollator packs)  Planned therapy interventions: manual therapy, prosthetic fitting/training, joint mobilization, strengthening, stretching and home exercise program  Other planned therapy interventions: dry needling  Duration in visits: 8  Treatment plan discussed with: patient        Manual Therapy:    5     mins  09454;  Therapeutic Exercise:    10     mins  37471;     Neuromuscular Gabe:    -    mins  87720;    Therapeutic Activity:     -     mins  61364;     Gait Training:      -     mins  06261;     Ultrasound:     8     mins  20321;    Electrical Stimulation:    -     mins  98286 ( );  Iontophoresis                 -     mins 73078  Dry Needling                 15      mins self-pay      Timed Treatment:   23   mins   Total Treatment:     70   mins    PT SIGNATURE: BRICE Horowitz License # [id number]  DATE TREATMENT INITIATED: 6/26/2019    Initial Certification  Certification Period: 9/24/2019  I certify that the therapy  services are furnished while this patient is under my care.  The services outlined above are required by this patient, and will be reviewed every 90 days.     PHYSICIAN: Ellie Santo MD      DATE:     Please sign and return via fax to [phone].. Thank you, [facility].

## 2019-06-26 NOTE — PATIENT INSTRUCTIONS
Access Code: [email or web address]   URL: [email or web address]   Date: 06/26/2019   Prepared by: Juliana Pagan     Exercises   Resistance Bar Twist for Tennis Elbow - 10 reps - 1 sets - 1x daily   Seated Eccentric Wrist Extension - 10 reps - 2 sets - 5 hold - 1x daily   Seated Forearm Pronation and Supination AROM - 20 reps - 1 sets - 2x daily   Standing Wrist Flexion Stretch - 4 reps - 1 sets - 20 hold - 2x daily   Patient Education   Tennis Elbow    Patient was educated on findings of evaluation, purpose of treatment, and goals for therapy.  Tennis elbow strap was applied with instructions on proper placement. Treatment options discussed and questions answered.  Patient was educated on exercises/self treatment/pain relief techniques.

## 2019-09-25 DIAGNOSIS — Z00.00 ANNUAL PHYSICAL EXAM: Primary | ICD-10-CM

## 2019-09-26 LAB
ALBUMIN SERPL-MCNC: 4.5 G/DL (ref 3.5–5.2)
ALBUMIN/GLOB SERPL: 2.1 G/DL
ALP SERPL-CCNC: 42 U/L (ref 39–117)
ALT SERPL-CCNC: 13 U/L (ref 1–33)
AST SERPL-CCNC: 17 U/L (ref 1–32)
BASOPHILS # BLD AUTO: 0.02 10*3/MM3 (ref 0–0.2)
BASOPHILS NFR BLD AUTO: 0.5 % (ref 0–1.5)
BILIRUB SERPL-MCNC: 0.4 MG/DL (ref 0.2–1.2)
BUN SERPL-MCNC: 15 MG/DL (ref 6–20)
BUN/CREAT SERPL: 16.3 (ref 7–25)
CALCIUM SERPL-MCNC: 9.3 MG/DL (ref 8.6–10.5)
CHLORIDE SERPL-SCNC: 102 MMOL/L (ref 98–107)
CHOLEST SERPL-MCNC: 163 MG/DL (ref 0–200)
CHOLEST/HDLC SERPL: 2.33 {RATIO}
CO2 SERPL-SCNC: 24.9 MMOL/L (ref 22–29)
CREAT SERPL-MCNC: 0.92 MG/DL (ref 0.57–1)
EOSINOPHIL # BLD AUTO: 0.19 10*3/MM3 (ref 0–0.4)
EOSINOPHIL NFR BLD AUTO: 4.5 % (ref 0.3–6.2)
ERYTHROCYTE [DISTWIDTH] IN BLOOD BY AUTOMATED COUNT: 11.8 % (ref 12.3–15.4)
FT4I SERPL CALC-MCNC: 1.7 (ref 1.2–4.9)
GLOBULIN SER CALC-MCNC: 2.1 GM/DL
GLUCOSE SERPL-MCNC: 83 MG/DL (ref 65–99)
HCT VFR BLD AUTO: 41.4 % (ref 34–46.6)
HDLC SERPL-MCNC: 70 MG/DL (ref 40–60)
HGB BLD-MCNC: 13.3 G/DL (ref 12–15.9)
IMM GRANULOCYTES # BLD AUTO: 0.01 10*3/MM3 (ref 0–0.05)
IMM GRANULOCYTES NFR BLD AUTO: 0.2 % (ref 0–0.5)
LDLC SERPL CALC-MCNC: 80 MG/DL (ref 0–100)
LYMPHOCYTES # BLD AUTO: 1.58 10*3/MM3 (ref 0.7–3.1)
LYMPHOCYTES NFR BLD AUTO: 37.1 % (ref 19.6–45.3)
MCH RBC QN AUTO: 30 PG (ref 26.6–33)
MCHC RBC AUTO-ENTMCNC: 32.1 G/DL (ref 31.5–35.7)
MCV RBC AUTO: 93.2 FL (ref 79–97)
MONOCYTES # BLD AUTO: 0.36 10*3/MM3 (ref 0.1–0.9)
MONOCYTES NFR BLD AUTO: 8.5 % (ref 5–12)
NEUTROPHILS # BLD AUTO: 2.1 10*3/MM3 (ref 1.7–7)
NEUTROPHILS NFR BLD AUTO: 49.2 % (ref 42.7–76)
NRBC BLD AUTO-RTO: 0 /100 WBC (ref 0–0.2)
PLATELET # BLD AUTO: 244 10*3/MM3 (ref 140–450)
POTASSIUM SERPL-SCNC: 4.6 MMOL/L (ref 3.5–5.2)
PROT SERPL-MCNC: 6.6 G/DL (ref 6–8.5)
RBC # BLD AUTO: 4.44 10*6/MM3 (ref 3.77–5.28)
SODIUM SERPL-SCNC: 140 MMOL/L (ref 136–145)
T3RU NFR SERPL: 26 % (ref 24–39)
T4 SERPL-MCNC: 6.7 UG/DL (ref 4.5–12)
TRIGL SERPL-MCNC: 63 MG/DL (ref 0–150)
TSH SERPL DL<=0.005 MIU/L-ACNC: 3.12 UIU/ML (ref 0.45–4.5)
VLDLC SERPL CALC-MCNC: 12.6 MG/DL
WBC # BLD AUTO: 4.26 10*3/MM3 (ref 3.4–10.8)

## 2019-09-30 ENCOUNTER — OFFICE VISIT (OUTPATIENT)
Dept: FAMILY MEDICINE CLINIC | Facility: CLINIC | Age: 54
End: 2019-09-30

## 2019-09-30 VITALS
OXYGEN SATURATION: 99 % | SYSTOLIC BLOOD PRESSURE: 114 MMHG | HEIGHT: 63 IN | HEART RATE: 73 BPM | TEMPERATURE: 97.8 F | WEIGHT: 115.2 LBS | BODY MASS INDEX: 20.41 KG/M2 | DIASTOLIC BLOOD PRESSURE: 78 MMHG

## 2019-09-30 DIAGNOSIS — H01.119 CONTACT DERMATITIS OF EYELID, UNSPECIFIED LATERALITY: ICD-10-CM

## 2019-09-30 DIAGNOSIS — Z00.00 ANNUAL PHYSICAL EXAM: Primary | ICD-10-CM

## 2019-09-30 DIAGNOSIS — Z23 NEED FOR INFLUENZA VACCINATION: ICD-10-CM

## 2019-09-30 PROCEDURE — 90471 IMMUNIZATION ADMIN: CPT | Performed by: PHYSICIAN ASSISTANT

## 2019-09-30 PROCEDURE — 99396 PREV VISIT EST AGE 40-64: CPT | Performed by: PHYSICIAN ASSISTANT

## 2019-09-30 PROCEDURE — 90674 CCIIV4 VAC NO PRSV 0.5 ML IM: CPT | Performed by: PHYSICIAN ASSISTANT

## 2019-09-30 RX ORDER — MELOXICAM 15 MG/1
15 TABLET ORAL AS NEEDED
COMMUNITY
Start: 2019-08-02 | End: 2022-01-26 | Stop reason: SDUPTHER

## 2019-10-01 PROBLEM — H01.119 CONTACT DERMATITIS OF EYELID: Status: ACTIVE | Noted: 2019-10-01

## 2019-11-08 ENCOUNTER — APPOINTMENT (OUTPATIENT)
Dept: WOMENS IMAGING | Facility: HOSPITAL | Age: 54
End: 2019-11-08

## 2019-11-08 ENCOUNTER — TELEPHONE (OUTPATIENT)
Dept: SURGERY | Facility: CLINIC | Age: 54
End: 2019-11-08

## 2019-11-08 PROCEDURE — 77067 SCR MAMMO BI INCL CAD: CPT | Performed by: RADIOLOGY

## 2019-11-08 PROCEDURE — 77063 BREAST TOMOSYNTHESIS BI: CPT | Performed by: RADIOLOGY

## 2019-11-08 NOTE — TELEPHONE ENCOUNTER
New PT appointment with Dr OVIEDO  12-13-19 arrive 10:30      Pt v/u with appt    Mailed pw    kdl

## 2019-11-14 ENCOUNTER — TELEPHONE (OUTPATIENT)
Dept: SURGERY | Facility: CLINIC | Age: 54
End: 2019-11-14

## 2019-11-14 NOTE — TELEPHONE ENCOUNTER
Confirmed with Dr. Morales's office   Diag MMG ordered. This needs to be scheduled     I have left patient a message to contact Dr. Kirkland's office to be scheduled for this.     This will be needed prior to appt with Dr. Alina galicia

## 2019-11-20 ENCOUNTER — APPOINTMENT (OUTPATIENT)
Dept: WOMENS IMAGING | Facility: HOSPITAL | Age: 54
End: 2019-11-20

## 2019-11-20 PROCEDURE — 77065 DX MAMMO INCL CAD UNI: CPT | Performed by: RADIOLOGY

## 2019-11-20 PROCEDURE — 77061 BREAST TOMOSYNTHESIS UNI: CPT | Performed by: RADIOLOGY

## 2019-11-20 PROCEDURE — 76641 ULTRASOUND BREAST COMPLETE: CPT | Performed by: RADIOLOGY

## 2019-11-20 PROCEDURE — G0279 TOMOSYNTHESIS, MAMMO: HCPCS | Performed by: RADIOLOGY

## 2019-11-25 DIAGNOSIS — M54.50 ACUTE MIDLINE LOW BACK PAIN WITHOUT SCIATICA: Primary | ICD-10-CM

## 2019-11-25 DIAGNOSIS — H01.119 CONTACT DERMATITIS OF EYELID, UNSPECIFIED LATERALITY: ICD-10-CM

## 2019-11-25 RX ORDER — METHOCARBAMOL 500 MG/1
500 TABLET, FILM COATED ORAL 3 TIMES DAILY
Qty: 90 TABLET | Refills: 3 | Status: SHIPPED | OUTPATIENT
Start: 2019-11-25 | End: 2022-01-26 | Stop reason: SDUPTHER

## 2019-11-26 ENCOUNTER — TELEPHONE (OUTPATIENT)
Dept: SURGERY | Facility: CLINIC | Age: 54
End: 2019-11-26

## 2019-12-10 ENCOUNTER — APPOINTMENT (OUTPATIENT)
Dept: WOMENS IMAGING | Facility: HOSPITAL | Age: 54
End: 2019-12-10

## 2019-12-10 PROCEDURE — 19083 BX BREAST 1ST LESION US IMAG: CPT | Performed by: RADIOLOGY

## 2019-12-16 DIAGNOSIS — H01.119 CONTACT DERMATITIS OF EYELID, UNSPECIFIED LATERALITY: ICD-10-CM

## 2019-12-17 ENCOUNTER — OFFICE VISIT (OUTPATIENT)
Dept: SURGERY | Facility: CLINIC | Age: 54
End: 2019-12-17

## 2019-12-17 ENCOUNTER — HOSPITAL ENCOUNTER (OUTPATIENT)
Dept: SURGERY | Facility: HOSPITAL | Age: 54
Discharge: HOME | End: 2019-12-17
Admitting: SURGERY

## 2019-12-17 ENCOUNTER — TELEPHONE (OUTPATIENT)
Dept: SURGERY | Facility: CLINIC | Age: 54
End: 2019-12-17

## 2019-12-17 VITALS
OXYGEN SATURATION: 99 % | HEART RATE: 75 BPM | SYSTOLIC BLOOD PRESSURE: 121 MMHG | WEIGHT: 115.2 LBS | HEIGHT: 63 IN | BODY MASS INDEX: 20.41 KG/M2 | DIASTOLIC BLOOD PRESSURE: 84 MMHG

## 2019-12-17 DIAGNOSIS — R63.4 WEIGHT LOSS: ICD-10-CM

## 2019-12-17 DIAGNOSIS — N64.4 MASTODYNIA: ICD-10-CM

## 2019-12-17 DIAGNOSIS — N95.1 PERIMENOPAUSE: ICD-10-CM

## 2019-12-17 DIAGNOSIS — R92.8 ABNORMAL ULTRASOUND OF BREAST: ICD-10-CM

## 2019-12-17 DIAGNOSIS — N63.0 LUMP OR MASS IN BREAST: ICD-10-CM

## 2019-12-17 DIAGNOSIS — N60.19 FIBROCYSTIC BREAST DISEASE (FCBD), UNSPECIFIED LATERALITY: ICD-10-CM

## 2019-12-17 DIAGNOSIS — N60.01 SOLITARY CYST OF RIGHT BREAST: Primary | ICD-10-CM

## 2019-12-17 PROCEDURE — 19000 PUNCTURE ASPIR CYST BREAST: CPT | Performed by: SURGERY

## 2019-12-17 PROCEDURE — 76942 ECHO GUIDE FOR BIOPSY: CPT | Performed by: SURGERY

## 2019-12-17 PROCEDURE — 76942 ECHO GUIDE FOR BIOPSY: CPT

## 2019-12-17 PROCEDURE — 99243 OFF/OP CNSLTJ NEW/EST LOW 30: CPT | Performed by: SURGERY

## 2019-12-17 RX ORDER — CHOLECALCIFEROL (VITAMIN D3) 125 MCG
5 CAPSULE ORAL
COMMUNITY

## 2019-12-17 RX ORDER — CETIRIZINE HYDROCHLORIDE 10 MG/1
10 TABLET ORAL AS NEEDED
COMMUNITY

## 2019-12-17 RX ORDER — MAGNESIUM OXIDE 400 MG/1
400 TABLET ORAL DAILY
COMMUNITY

## 2019-12-17 NOTE — TELEPHONE ENCOUNTER
Scheduled Bilateral Screen 3D Mammo  [facility] 5-18-20 @ 11:00    Return to see   6-1-20 arrive 11:45      LM on patient's phone'  kdl

## 2019-12-17 NOTE — PROGRESS NOTES
Chief Complaint: Dina Roche is a 54 y.o. female who was seen in consultation at the request of Marivel Morales MD  for breast mass and abnormal breast imaging    History of Present Illness:  Patient presents with breast mass, abnormal breast imaging and breast pain.  She has had breast tenderness for several years.  She is perimenopausal as of her mid 40s.  She took Loestrin from her mid 40s through 2018 to regulate her menstrual cycle due to the irregularity.  She stopped this in 2018.  She intentionally lost 20 pounds over the past year via diet and exercise.  Her last period was 2019.  She tells me that yesterday she did have a little bit of spotting.  In September she did notice a worsening of her breast pain she noted an associated palpable mass right lateral breast at that time.  She says that it is some better but the mass and the pain still bother her.  She describes the pain as achy and several times per week.  She noted no other new masses, skin changes, nipple discharge, nipple changes prior to her most recent imaging.  Her most recent imaging includes the followin2018  [facility]   Mammo  Dina Roche  BILATERAL SCREENING MAMMOGRAM WITH TOMOSYNTHESIS  Heterogeneously dense.  Benign-appearing calcifications and benign-appearing oval masses again seen in both breasts.  BIRADS Category: 2, Benign.    2018  [facility]   Radiology  Dina Roche  The patient is seen for palpable lump or thickening in the right breast.  RIGHT DIAGNOSTIC MAMMOGRAM WITH TOMOSYNTHESIS  Heterogeneously dense.  Oval mass measuring 4 cm with circumscribed margins seen in the right breast at 12:00.  RIGHT BREAST ULTRASOUND  Ultrasound is suggestive of a large elongated debris containing cyst withy well defined, thin margins measuring 4 cm. this correlates to the palpable area. This has increased since the year.  IMPRESSION:  Benign-negative. Aspiration could  be performed for patient comfort.  BIRADS Category: 2, Benign.    11/08/2019  [facility]   Mammo  Dina Bosko  BILATERAL SCREENING MAMMOGRAM WITH TOMOSYNTHESIS  Heterogeneously dense.  Equal density asymmetry measuring 5 millimeters seen in the MLO view only seen in the central region of the left breast.  BIRADS Category: 0    11/20/2019  [facility]   Radiology  Dina Bosko  LEFT DIGITAL DIAGNOSTIC MAMMOGRAM WITH TOMOSYNTHESIS  Heterogeneously dense.  Finding 1: Additional evaluation asymmetry in the left breast, central. The area of asymmetric breast tissue presses out.  LEFT BREAST ULTRASOUND  Finding 1: Normal fibroglandular and adipose tissue throughout the central-breast.  Finding 2: Hypoechoic are with indistinct margins measuring 6 x 3 x 5 mm seen in the 1:30 region of the left breast located 4 centimeters from the nipple.  Finding 3: Complex solid cystic oval mass with circumscribed margins measuring 6 x 3 x 5 mm 1:30 region of the left breast located 1 centimeter from the nipple. Mural nodularity/solid component measures 2 mm.  Finding 4: Oval parallel mass with circumscribed margins measuring 16 x 8 x 12 mm seen sub-areolar region of the left breast.  IMPRESSION:  Finding 1: Normal tissue centrally and scattered cysts in the left breast is benign-negative.  Finding 2: Hypoechoic indistinct area in the 1:30 region of the left breast located 4 centimeters from the nipple requires additional evaluation. A physician-directed breast ultrasound is recommended at the time of the targeting for biopsy below.  Finding 3: Complex mass in the 1:320 region of the left breast located 1 centimeters from the nipple is suspicious. Ultrasound guided biopsy is recommended.  Finding 4: Mass in the sub-areolar region of the left breast is probably benign. Follow-up ultrasound exam in 6 months is recommended.  BIRADS Category: 4A      She had a biopsy on the following day that showed:    12/10/19 [facility]  LEFT ULTRASOUND GUIDED BIOPSY  LETY ROCHE   1:30 left breast 12 gauge. Multiple cores. A total of 7 cores, a minicork tissue marker was placed at the biopsy site through the biopsy device. ADDENDUM 12/13/19: The cyst with mural nodule at the 1:30 position of the left breast that was biopsied re[presented benign fibrofatty breast tissue showing usual duct hyperplasia with microcalcifations, columnar cell change, sclerosing adenosis with microcalifications. Lobular microcalifications, apocrine metaplasia, cystically dilated and ecstatic ducts and hyalinized stromal fibrosis were also noted. The findings are concordant. The biopsy site was marked with a “barrel” shaped clip. It is in good position. At the time of the ultrasound there was a small hypoechoic area at the 1:30 position at the left breast.     12/10/2019  [facility]     Pathology  Lety Roche  Left Breast Tissue at 1:30. Ultrasound guided biopsy:   Benign fibrofatty breast tissue showing usual duct hyperplasia with microcalifications, columnar cell change, sclerosing adenosis with microcalifications, lobular microcalifications, apocrine metaplasia, cystically dilated and Ecstatic ducts, and hyalinized stromal fibrosis.       She has had a right cyst aspiration in the past in 2014.  She has her uterus and ovaries, is perimenopausal, and takes nor hormones.  See above  Her family history includes the following: She has 1 daughter, 1 sister, 2 maternal aunts, 2 paternal aunts.  No family history of breast or ovarian cancer.  She is here for evaluation.    Review of Systems:  Review of Systems   All other systems reviewed and are negative.       Past Medical and Surgical History:  Breast Biopsy History:  Patient has had the following breast biopsies:LEFT BREAST 1:30 BENIGN   Breast Cancer HIstory:  Patient does not have a past medical history of breast cancer.  Breast Operations, and year:  NONE   Obstetric/Gynecologic History:  Age menstrual  "periods began: 15  Patient is premenopausal, first day of last period: 7/12/19  Number of pregnancies: 4  Number of live births: 2  Number of abortions or miscarriages: 2  Age of delivery of first child:   Patient did not breast feed.  Length of time taking birth control pills: 15-20 YRS   Patient has never taken hormone replacement  PATIENT HAS BOTH OVARIES AND UTERUS.     Past Surgical History:   Procedure Laterality Date   • BREAST BIOPSY     • VARICOSE VEIN SURGERY         Past Medical History:   Diagnosis Date   • Allergic    • Eczema    • Injury of back    • Scoliosis    • Tennis elbow    • Varicose veins of bilateral lower extremities with other complications 2010    Dr. Newman       Prior Hospitalizations, other than for surgery or childbirth, and year:  NONE     Social History     Socioeconomic History   • Marital status:      Spouse name: Not on file   • Number of children: Not on file   • Years of education: Not on file   • Highest education level: Not on file   Tobacco Use   • Smoking status: Never Smoker   • Smokeless tobacco: Never Used   Substance and Sexual Activity   • Alcohol use: Yes     Alcohol/week: 4.0 standard drinks     Types: 4 Glasses of wine per week     Frequency: Never   • Drug use: No     Patient is .  Patient is unemployed.  Patient drinks 2 servings of caffeine per day.    Family History:  History reviewed. No pertinent family history.    Vital Signs:  /84 (BP Location: Left arm, Patient Position: Sitting, Cuff Size: Adult)   Pulse 75   Ht 160 cm (63\")   Wt 52.3 kg (115 lb 3.2 oz)   LMP 07/12/2019 (Exact Date)   SpO2 99%   Breastfeeding No   BMI 20.41 kg/m²      Medications:    Current Outpatient Medications:   •  cetirizine (zyrTEC) 10 MG tablet, Take 10 mg by mouth As Needed for Allergies., Disp: , Rfl:   •  Crisaborole (EUCRISA) 2 % ointment, Apply 1 application topically 2 (Two) Times a Day As Needed (rash)., Disp: 60 g, Rfl: 2  •  magnesium oxide " "(MAG-OX) 400 MG tablet, Take 400 mg by mouth Daily., Disp: , Rfl:   •  melatonin 5 MG tablet tablet, Take 5 mg by mouth., Disp: , Rfl:   •  meloxicam (MOBIC) 15 MG tablet, 15 mg As Needed (as needed-rare)., Disp: , Rfl:   •  methocarbamol (ROBAXIN) 500 MG tablet, Take 1 tablet by mouth 3 (Three) Times a Day. As needed for back pain (Patient taking differently: Take 500 mg by mouth As Needed (as needed rare). As needed for back pain), Disp: 90 tablet, Rfl: 3  •  Multiple Vitamin (MULTI-VITAMIN DAILY PO), Take  by mouth., Disp: , Rfl:   •  Probiotic capsule, Take  by mouth., Disp: , Rfl:      Allergies:  No Known Allergies    Physical Examination:  /84 (BP Location: Left arm, Patient Position: Sitting, Cuff Size: Adult)   Pulse 75   Ht 160 cm (63\")   Wt 52.3 kg (115 lb 3.2 oz)   LMP 07/12/2019 (Exact Date)   SpO2 99%   Breastfeeding No   BMI 20.41 kg/m²   General Appearance:  Patient is in no distress.  She is well kept and has an thin build.   Psychiatric:  Patient with appropriate mood and affect. Alert and oriented to self, time, and place.    Breast, RIGHT:  small sized, 32A, symmetric with the contralateral side.  Breast skin is without erythema, edema, rashes.  There are no visible abnormalities upon inspection during the arm-raising maneuver or with hands on hips in the sitting position. There is no nipple retraction, discharge or nipple/areolar skin changes.there is a 3 cm mass palpable at the right breast 9:00, 1 cm from the nipple location that is tender and is the area of interest to the patient.  No overlying skin changes.  Smoothly marginated.  There are no other masses palpable in the sitting or supine positions.    Breast, LEFT:  small sized, 32 A, symmetric with the contralateral side.  Breast skin is without erythema, edema, rashes.  There are no visible abnormalities upon inspection during the arm-raising maneuver or with hands on hips in the sitting position. There is no nipple " retraction, discharge or nipple/areolar skin changes.There are no masses palpable in the sitting or supine positions.  There are mild ecchymoses lateral left breast from her recent biopsy.    Lymphatic:  There is no axillary, cervical, infraclavicular, or supraclavicular adenopathy bilaterally.  Eyes:  Pupils are round and reactive to light.  Cardiovascular:  Heart rate and rhythm are regular.  Respiratory:  Lungs are clear bilaterally with no crackles or wheezes in any lung field.  Gastrointestinal:  Abdomen is soft, nondistended, and nontender.     Musculoskeletal:  Good strength in all 4 extremities.   There is good range of motion in both shoulders.    Skin:  No new skin lesions or rashes on the skin excluding the breast (see breast exam above).        Imagin2017  [facility]   Mammo  Dina Bosko  BILATERAL SCREENING MAMMOGRAM WITH TOMOSYNTHESIS  Heterogeneously dense.  Finding 1: There are multiple oval masses of varying size seen in both breasts.  Most consistent with multiple cysts. No significant changed.  Finding 2: There are several round and punctate calcifications in both breasts.  IMPRESSION:  BIRADS Category: 2, Benign.    2018  [facility]   Mammo  Dina Bos  BILATERAL SCREENING MAMMOGRAM WITH TOMOSYNTHESIS  Heterogeneously dense.  Benign-appearing calcifications and benign-appearing oval masses again seen in both breasts.  BIRADS Category: 2, Benign.    2018  [facility]   Radiology  [facility]  The patient is seen for palpable lump or thickening in the right breast.  RIGHT DIAGNOSTIC MAMMOGRAM WITH TOMOSYNTHESIS  Heterogeneously dense.  Oval mass measuring 4 cm with circumscribed margins seen in the right breast at 12:00.  RIGHT BREAST ULTRASOUND  Ultrasound is suggestive of a large elongated debris containing cyst withy well defined, thin margins measuring 4 cm. this correlates to the palpable area. This has increased since the  year.  IMPRESSION:  Benign-negative. Aspiration could be performed for patient comfort.  BIRADS Category: 2, Benign.    11/08/2019  [facility]   Mammo  Dina Bosko  BILATERAL SCREENING MAMMOGRAM WITH TOMOSYNTHESIS  Heterogeneously dense.  Equal density asymmetry measuring 5 millimeters seen in the MLO view only seen in the central region of the left breast.  BIRADS Category: 0    11/20/2019  [facility]   Radiology  Dina Bosko  LEFT DIGITAL DIAGNOSTIC MAMMOGRAM WITH TOMOSYNTHESIS  Heterogeneously dense.  Finding 1: Additional evaluation asymmetry in the left breast, central. The area of asymmetric breast tissue presses out.  LEFT BREAST ULTRASOUND  Finding 1: Normal fibroglandular and adipose tissue throughout the central-breast.  Finding 2: Hypoechoic are with indistinct margins measuring 6 x 3 x 5 mm seen in the 1:30 region of the left breast located 4 centimeters from the nipple.  Finding 3: Complex solid cystic oval mass with circumscribed margins measuring 6 x 3 x 5 mm 1:30 region of the left breast located 1 centimeter from the nipple. Mural nodularity/solid component measures 2 mm.  Finding 4: Oval parallel mass with circumscribed margins measuring 16 x 8 x 12 mm seen sub-areolar region of the left breast.  IMPRESSION:  Finding 1: Normal tissue centrally and scattered cysts in the left breast is benign-negative.  Finding 2: Hypoechoic indistinct area in the 1:30 region of the left breast located 4 centimeters from the nipple requires additional evaluation. A physician-directed breast ultrasound is recommended at the time of the targeting for biopsy below.  Finding 3: Complex mass in the 1:320 region of the left breast located 1 centimeters from the nipple is suspicious. Ultrasound guided biopsy is recommended.  Finding 4: Mass in the sub-areolar region of the left breast is probably benign. Follow-up ultrasound exam in 6 months is recommended.  BIRADS Category:  4A    Pathology:  12/10/19 [facility]  LEFT ULTRASOUND GUIDED BIOPSY  LETY BOSANDI   1:30 left breast 12 gauge. Multiple cores. A total of 7 cores, a minicork tissue marker was placed at the biopsy site through the biopsy device. ADDENDUM 12/13/19: The cyst with mural nodule at the 1:30 position of the left breast that was biopsied re[presented benign fibrofatty breast tissue showing usual duct hyperplasia with microcalcifations, columnar cell change, sclerosing adenosis with microcalifications. Lobular microcalifications, apocrine metaplasia, cystically dilated and ecstatic ducts and hyalinized stromal fibrosis were also noted. The findings are concordant. The biopsy site was marked with a “barrel” shaped clip. It is in good position. At the time of the ultrasound there was a small hypoechoic area at the 1:30 position at the left breast.     12/10/2019  [facility]     Pathology  Lety Kaley  Left Breast Tissue at 1:30. Ultrasound guided biopsy:   Benign fibrofatty breast tissue showing usual duct hyperplasia with microcalifications, columnar cell change, sclerosing adenosis with microcalifications, lobular microcalifications, apocrine metaplasia, cystically dilated and Ecstatic ducts, and hyalinized stromal fibrosis.     Procedures:  Ultrasound-guided cyst aspiration 12-17-19  Indication:  symptomatic cyst  Location: RIGHT 9:00, 1 CFN  Consent:  The risks, benefits, and alternatives to the procedure were discussed with the patient, who understood and wished to proceed.  The risks described included, but were not limited to, bleeding, infection, pneumothorax, and cyst recurrence requiring percutaneous excisional biopsy.  Description of Procedure:   After the patient was positioned supine on the procedure table, I located the cyst using ultrasound.   The cyst was a smoothly marginated anechoic fluid collection wider than tall with good through-transmission and corner shadowing.  It measured in the radial dimension 2.14 x 1.13  cm and in the antiradial dimension 1.9 x 1.3 cm.  I prepped and draped the breast skin in sterile fashion.  I anesthetized the breast skin with 1% lidocaine with epinephrine.  I then anesthetized the underlying subcutaneous tissue and breast parenchyma surrounding the lesion with 1% lidocaine with epinephrine under ultrasound visualization and guidance. I then inserted a 21 G needle (attached to a syringe) into the cyst under ultrasound guidance and aspirated the cyst fluid until the cyst completely disappeared.  3 mL.  The fluid aspirated was typical cyst fluid, greenish-brown, nonbloody.  The fluid was discarded.  Manual compression was held for 5 minutes and a bandaid applied.  Marker placed: none  Tolerance: The patient tolerated the procedure well.  Disposition: We will see her back in 3 months for a repeat exam and in office ultrasound.    Assessment:   Diagnosis Plan   1. Solitary cyst of right breast  US Guided Cyst Aspiration Breast Right    No Lab Testing Needed   2. Lump or mass in breast     3. Fibrocystic breast disease (FCBD), unspecified laterality  US Breast Left Limited   4. Mastodynia     5. Abnormal ultrasound of breast  US Breast Left Limited   6. Perimenopause     7. Weight loss     1-2  RIGHT 9:00, 1 CFN- new mass as of 9-2019- noted due to pain  - bedside ultrasound showed 2.1 cm simple cyst- aspirated 12-17-19    3,5  LEFt breast  1- 1:30, 4 CFN- 6mm hypoechoic region seen as density on mammogram- initially given BR 4, but at time of procedure for #2 below, felt to be a BR3- rec US 5-2020    2- 1:30, 1 CFN- 6mm complex cystc with mural nodule- BR4A- core biopsy - showed usual hyperplasia with calcifications, columnar cell change, sclerosing adenosis, lobular microcalcification, apocrine metaplasia, cystic dilated and ectatic ducts, stromal fibrosis.    3-  LEFT RA 1.6 cm mass- BR3      3-4  Bilateral diffuse breast pain associated with FCC, mild      6-  Irregular menses since mid 40s- took  loestrin until Junw 2018 for this-since that time has had irregular menses again- last was July 12,2019    7-  Intentional 20# weight loss [id number] based on diet and exercise    Plan:  The patient goes by Mariaelena, her middle name.  Mariaelena and I reviewed her history, imaging, imaging reports, pathology reports and examination together today.  We discussed that she does have fibrocystic condition.  We discussed that this really describes a hormonal responsiveness of the breast tissue.  She has had a rapid weight loss of 20 pounds over the year and also has come off of exogenous hormones in June 2018 and is also perimenopausal.  All of these things affect her hormonal cycling.  She has had tenderness and cyst aspirations in the past.     With regards to the condition of fibrocystic change I did advise that she try sports bra, change her coffee 2 cups daily to decaffeinated, and try vitamin E 400 units once or twice per day.  We gave her handout on this today.  I asked her to continue her self breast exam and to call us in the interim with any concerns.  We discussed that she is likely to have additional palpable cyst in the future but not to assume that that is what it is and let us know if she has any new concerning mass.    Today she specifically comes for a new mass right lateral breast that on bedside ultrasound has a core length of a approximately 2 cm simple cyst.  We discussed aspiration to relieve her tenderness versus not aspirating and she wished to have this aspirated today.  She tolerated this well and we returned 3 cm of normal cyst fluid.  She felt immediate relief.  She also has microscopic fibrocystic change on her most recent biopsy and macrocysts on her imaging.  We discussed the nature of a BI-RADS 3 designation and the recommendation for a 6-month follow-up left breast ultrasound for the 130 lesion that was not sampled as well as the retroareolar solid mass.    [facility]    Her next routine screening mammogram will be due November 9, 2020.    We will follow-up the site of cyst aspiration on the right clinically when she returns for her next visit as well.        Janette Fuller MD        We have spent 45 minutes in visit today, 25 in face to face .      Next Appointment:  Return for Next scheduled follow up, after imaging.      EMR Dragon/transcription disclaimer:    Much of this encounter note is an electronic transcription/translocation of spoken language to printed text.  The electronic translation of spoken language may permit erroneous, or at times, nonsensical words or phrases to be inadvertently transcribed.  Although I have reviewed the note from such areas, some may still exist.

## 2019-12-23 RX ORDER — MELOXICAM 15 MG/1
15 TABLET ORAL DAILY
Qty: 30 TABLET | Refills: 0 | OUTPATIENT
Start: 2019-12-23

## 2019-12-23 NOTE — TELEPHONE ENCOUNTER
----- Message from Dina Roche sent at 12/23/2019  3:03 PM EST -----  Regarding: Prescription Question  Contact: [phone]  MY CHART MESSAGE:      Good afternoon,  Could I please get a refill for the Meloxicam, 15mg, that was prescribed to me last December.   It works well, when my elbow pain flares up.   Thank you and Latanya Herman!  Dina Roche

## 2019-12-30 DIAGNOSIS — H01.119 CONTACT DERMATITIS OF EYELID, UNSPECIFIED LATERALITY: ICD-10-CM

## 2020-01-06 ENCOUNTER — TELEPHONE (OUTPATIENT)
Dept: FAMILY MEDICINE CLINIC | Facility: CLINIC | Age: 55
End: 2020-01-06

## 2020-01-06 NOTE — TELEPHONE ENCOUNTER
[facility] said that Eucrisa is not covered.  Preferred medication would be flunisonide 0.05% or mometasone 0.1%.

## 2020-01-07 DIAGNOSIS — H01.119 CONTACT DERMATITIS OF EYELID, UNSPECIFIED LATERALITY: Primary | ICD-10-CM

## 2020-01-07 RX ORDER — MOMETASONE FUROATE 1 MG/G
CREAM TOPICAL DAILY
Qty: 45 G | Refills: 1 | Status: SHIPPED | OUTPATIENT
Start: 2020-01-07

## 2020-02-04 ENCOUNTER — TELEPHONE (OUTPATIENT)
Dept: FAMILY MEDICINE CLINIC | Facility: CLINIC | Age: 55
End: 2020-02-04

## 2020-02-04 NOTE — TELEPHONE ENCOUNTER
Pt said you sent in Elocon cream for her because Eucrisa PA was denied.  She received the Elocon Rx from mail order.  Pt was told that this should not be used on her eyelids.  She will need a note stating that so she can return the Rx to [facility].

## 2020-05-18 ENCOUNTER — APPOINTMENT (OUTPATIENT)
Dept: WOMENS IMAGING | Facility: HOSPITAL | Age: 55
End: 2020-05-18

## 2020-05-18 PROCEDURE — 76641 ULTRASOUND BREAST COMPLETE: CPT | Performed by: RADIOLOGY

## 2020-05-19 ENCOUNTER — TELEPHONE (OUTPATIENT)
Dept: SURGERY | Facility: CLINIC | Age: 55
End: 2020-05-19

## 2020-05-19 NOTE — TELEPHONE ENCOUNTER
Patient's [facility] bilateral [facility]  Follow-up left breast ultrasound May 18, 2020 [facility]:  1.  130 left breast biopsy clip is seen mass is no longer visualized.    2.  On  US, an oval parallel hypoechoic mass 1.6 cm retroareolar left with no significant change.  BI-RADS 3 recommend left breast ultrasound in 6 months at the time of bilateral screening mammogram.  Some incidental simple cyst in the left breast are benign as well.

## 2020-06-01 ENCOUNTER — OFFICE VISIT (OUTPATIENT)
Dept: SURGERY | Facility: CLINIC | Age: 55
End: 2020-06-01

## 2020-06-01 ENCOUNTER — TELEPHONE (OUTPATIENT)
Dept: SURGERY | Facility: CLINIC | Age: 55
End: 2020-06-01

## 2020-06-01 VITALS
HEART RATE: 63 BPM | TEMPERATURE: 97.5 F | DIASTOLIC BLOOD PRESSURE: 76 MMHG | WEIGHT: 116 LBS | BODY MASS INDEX: 20.55 KG/M2 | OXYGEN SATURATION: 98 % | HEIGHT: 63 IN | SYSTOLIC BLOOD PRESSURE: 116 MMHG

## 2020-06-01 DIAGNOSIS — N60.01 SOLITARY CYST OF RIGHT BREAST: Primary | ICD-10-CM

## 2020-06-01 DIAGNOSIS — R92.8 ABNORMAL ULTRASOUND OF BREAST: ICD-10-CM

## 2020-06-01 DIAGNOSIS — N64.4 MASTODYNIA: ICD-10-CM

## 2020-06-01 DIAGNOSIS — N63.0 LUMP OR MASS IN BREAST: ICD-10-CM

## 2020-06-01 DIAGNOSIS — N60.19 FIBROCYSTIC BREAST DISEASE (FCBD), UNSPECIFIED LATERALITY: ICD-10-CM

## 2020-06-01 PROCEDURE — 99213 OFFICE O/P EST LOW 20 MIN: CPT | Performed by: SURGERY

## 2020-06-01 RX ORDER — VITAMIN E 268 MG
400 CAPSULE ORAL DAILY
COMMUNITY

## 2020-06-01 NOTE — TELEPHONE ENCOUNTER
Scheduled Bilateral 3D screen mammogram and Lt Breast U/S @   [facility] 11-9-20 @ 10:30  (scheduled with Hilary at [facility])    Return to see Dr. OVIEDO  11-20-20 arrive 1:45    Pt v/u    Hanh

## 2020-06-01 NOTE — PROGRESS NOTES
Chief Complaint: Dina Roche is a 54 y.o. female who was seen in consultation at the request of Marivel Morales MD  for breast mass and abnormal breast imaging    History of Present Illness:  Patient presents with breast mass, abnormal breast imaging and breast pain.  She has had breast tenderness for several years.  She is perimenopausal as of her mid 40s.  She took Loestrin from her mid 40s through 2018 to regulate her menstrual cycle due to the irregularity.  She stopped this in 2018.  She intentionally lost 20 pounds over the past year via diet and exercise.  Her last period was 2019.  She tells me that yesterday she did have a little bit of spotting.  In September she did notice a worsening of her breast pain she noted an associated palpable mass right lateral breast at that time.  She says that it is some better but the mass and the pain still bother her.  She describes the pain as achy and several times per week.  She noted no other new masses, skin changes, nipple discharge, nipple changes prior to her most recent imaging.  Her most recent imaging includes the followin2018  [facility]   Mammo  Dina Roche  BILATERAL SCREENING MAMMOGRAM WITH TOMOSYNTHESIS  Heterogeneously dense.  Benign-appearing calcifications and benign-appearing oval masses again seen in both breasts.  BIRADS Category: 2, Benign.    2018  [facility]   Radiology  Dina Roche  The patient is seen for palpable lump or thickening in the right breast.  RIGHT DIAGNOSTIC MAMMOGRAM WITH TOMOSYNTHESIS  Heterogeneously dense.  Oval mass measuring 4 cm with circumscribed margins seen in the right breast at 12:00.  RIGHT BREAST ULTRASOUND  Ultrasound is suggestive of a large elongated debris containing cyst withy well defined, thin margins measuring 4 cm. this correlates to the palpable area. This has increased since the year.  IMPRESSION:  Benign-negative. Aspiration could  be performed for patient comfort.  BIRADS Category: 2, Benign.    11/08/2019  [facility]   Mammo  Dina Bosko  BILATERAL SCREENING MAMMOGRAM WITH TOMOSYNTHESIS  Heterogeneously dense.  Equal density asymmetry measuring 5 millimeters seen in the MLO view only seen in the central region of the left breast.  BIRADS Category: 0    11/20/2019  [facility]   Radiology  Dina Bosko  LEFT DIGITAL DIAGNOSTIC MAMMOGRAM WITH TOMOSYNTHESIS  Heterogeneously dense.  Finding 1: Additional evaluation asymmetry in the left breast, central. The area of asymmetric breast tissue presses out.  LEFT BREAST ULTRASOUND  Finding 1: Normal fibroglandular and adipose tissue throughout the central-breast.  Finding 2: Hypoechoic are with indistinct margins measuring 6 x 3 x 5 mm seen in the 1:30 region of the left breast located 4 centimeters from the nipple.  Finding 3: Complex solid cystic oval mass with circumscribed margins measuring 6 x 3 x 5 mm 1:30 region of the left breast located 1 centimeter from the nipple. Mural nodularity/solid component measures 2 mm.  Finding 4: Oval parallel mass with circumscribed margins measuring 16 x 8 x 12 mm seen sub-areolar region of the left breast.  IMPRESSION:  Finding 1: Normal tissue centrally and scattered cysts in the left breast is benign-negative.  Finding 2: Hypoechoic indistinct area in the 1:30 region of the left breast located 4 centimeters from the nipple requires additional evaluation. A physician-directed breast ultrasound is recommended at the time of the targeting for biopsy below.  Finding 3: Complex mass in the 1:320 region of the left breast located 1 centimeters from the nipple is suspicious. Ultrasound guided biopsy is recommended.  Finding 4: Mass in the sub-areolar region of the left breast is probably benign. Follow-up ultrasound exam in 6 months is recommended.  BIRADS Category: 4A      She had a biopsy on the following day that showed:    12/10/19 [facility]  LEFT ULTRASOUND GUIDED BIOPSY  LETY ROCHE   1:30 left breast 12 gauge. Multiple cores. A total of 7 cores, a minicork tissue marker was placed at the biopsy site through the biopsy device. ADDENDUM 12/13/19: The cyst with mural nodule at the 1:30 position of the left breast that was biopsied re[presented benign fibrofatty breast tissue showing usual duct hyperplasia with microcalcifations, columnar cell change, sclerosing adenosis with microcalifications. Lobular microcalifications, apocrine metaplasia, cystically dilated and ecstatic ducts and hyalinized stromal fibrosis were also noted. The findings are concordant. The biopsy site was marked with a “barrel” shaped clip. It is in good position. At the time of the ultrasound there was a small hypoechoic area at the 1:30 position at the left breast.     12/10/2019  [facility]     Pathology  Lety Roche  Left Breast Tissue at 1:30. Ultrasound guided biopsy:   Benign fibrofatty breast tissue showing usual duct hyperplasia with microcalifications, columnar cell change, sclerosing adenosis with microcalifications, lobular microcalifications, apocrine metaplasia, cystically dilated and Ecstatic ducts, and hyalinized stromal fibrosis.       She has had a right cyst aspiration in the past in 2014.  She has her uterus and ovaries, is perimenopausal, and takes nor hormones.  See above  Her family history includes the following: She has 1 daughter, 1 sister, 2 maternal aunts, 2 paternal aunts.  No family history of breast or ovarian cancer.    Interval History:  In the interim,  Lety Roche  has done well.  She has noted no changes in her breast exam. No new masses, skin changes, nipple changes, nipple discharge either breast.   She denies headache, bone pain, belly pain, cough, changes in vision or gait.    Her most recent imaging includes the following:  [facility]'s [facility]  Follow-up left breast ultrasound [facility], 2020 [facility]:  1.  1:30 left breast biopsy clip is seen mass is no longer visualized.    2.  On  US, an oval parallel hypoechoic mass 1.6 cm retroareolar left with no significant change.      BI-RADS 3 recommend left breast ultrasound in 6 months at the time of bilateral screening mammogram.  Some incidental simple cyst in the left breast are benign as well.    She is here to review.    Review of Systems:  Review of Systems   Constitutional: Positive for unexpected weight change (1 lb wt gain  ).   All other systems reviewed and are negative.       Past Medical and Surgical History:  Breast Biopsy History:  Patient has had the following breast biopsies:LEFT BREAST 1:30 BENIGN   Breast Cancer HIstory:  Patient does not have a past medical history of breast cancer.  Breast Operations, and year:  NONE   Obstetric/Gynecologic History:  Age menstrual periods began: 15  Patient is premenopausal, first day of last period: 7/12/19  Number of pregnancies: 4  Number of live births: 2  Number of abortions or miscarriages: 2  Age of delivery of first child:   Patient did not breast feed.  Length of time taking birth control pills: 15-20 YRS   Patient has never taken hormone replacement  PATIENT HAS BOTH OVARIES AND UTERUS.     Past Surgical History:   Procedure Laterality Date   • BREAST BIOPSY     • VARICOSE VEIN SURGERY         Past Medical History:   Diagnosis Date   • Allergic    • Eczema    • Injury of back    • Scoliosis    • Tennis elbow    • Varicose veins of bilateral lower extremities with other complications 2010    Dr. Newman       Prior Hospitalizations, other than for surgery or childbirth, and year:  NONE     Social History     Socioeconomic History   • Marital status:      Spouse name: Not on file   • Number of children: Not on file   • Years of education: Not on file   • Highest education level: Not on file   Tobacco Use   • Smoking status:  "Never Smoker   • Smokeless tobacco: Never Used   Substance and Sexual Activity   • Alcohol use: Yes     Alcohol/week: 4.0 standard drinks     Types: 4 Glasses of wine per week     Frequency: Never   • Drug use: No     Patient is .  Patient is unemployed.  Patient drinks 2 servings of caffeine per day.    Family History:  History reviewed. No pertinent family history.    Vital Signs:  /76   Pulse 63   Temp 97.5 °F (36.4 °C) (Temporal)   Ht 160 cm (63\")   Wt 52.6 kg (116 lb)   LMP  (LMP Unknown)   SpO2 98%   Breastfeeding No   BMI 20.55 kg/m²      Medications:    Current Outpatient Medications:   •  BL EVENING PRIMROSE OIL PO, Take  by mouth., Disp: , Rfl:   •  cetirizine (zyrTEC) 10 MG tablet, Take 10 mg by mouth As Needed for Allergies., Disp: , Rfl:   •  Crisaborole (EUCRISA) 2 % ointment, Apply 1 application topically 2 (Two) Times a Day As Needed (rash)., Disp: 60 g, Rfl: 2  •  magnesium oxide (MAG-OX) 400 MG tablet, Take 400 mg by mouth Daily., Disp: , Rfl:   •  melatonin 5 MG tablet tablet, Take 5 mg by mouth., Disp: , Rfl:   •  meloxicam (MOBIC) 15 MG tablet, 15 mg As Needed (as needed-rare)., Disp: , Rfl:   •  methocarbamol (ROBAXIN) 500 MG tablet, Take 1 tablet by mouth 3 (Three) Times a Day. As needed for back pain (Patient taking differently: Take 500 mg by mouth As Needed (as needed rare). As needed for back pain), Disp: 90 tablet, Rfl: 3  •  mometasone (ELOCON) 0.1 % cream, Apply  topically to the appropriate area as directed Daily., Disp: 45 g, Rfl: 1  •  Multiple Vitamin (MULTI-VITAMIN DAILY PO), Take  by mouth., Disp: , Rfl:   •  Probiotic capsule, Take  by mouth., Disp: , Rfl:   •  vitamin E 400 UNIT capsule, Take 400 Units by mouth Daily., Disp: , Rfl:      Allergies:  No Known Allergies    Physical Examination:  /76   Pulse 63   Temp 97.5 °F (36.4 °C) (Temporal)   Ht 160 cm (63\")   Wt 52.6 kg (116 lb)   LMP  (LMP Unknown)   SpO2 98%   Breastfeeding No   BMI 20.55 " kg/m²   General Appearance:  Patient is in no distress.  She is well kept and has an thin build.   Psychiatric:  Patient with appropriate mood and affect. Alert and oriented to self, time, and place.    Breast, RIGHT:  small sized, 32A, symmetric with the contralateral side.  Breast skin is without erythema, edema, rashes.  There are no visible abnormalities upon inspection during the arm-raising maneuver or with hands on hips in the sitting position. There is no nipple retraction, discharge or nipple/areolar skin changes.  There are no  masses palpable in the sitting or supine positions.    Breast, LEFT:  small sized, 32 A, symmetric with the contralateral side.  Breast skin is without erythema, edema, rashes.  There are no visible abnormalities upon inspection during the arm-raising maneuver or with hands on hips in the sitting position. There is no nipple retraction, discharge or nipple/areolar skin changes.There are no masses palpable in the sitting or supine positions.     Lymphatic:  There is no axillary, cervical, infraclavicular, or supraclavicular adenopathy bilaterally.  Eyes:  Pupils are round and reactive to light.  Cardiovascular:  Heart rate and rhythm are regular.  Respiratory:  Lungs are clear bilaterally with no crackles or wheezes in any lung field.  Gastrointestinal:  Abdomen is soft, nondistended, and nontender.     Musculoskeletal:  Good strength in all 4 extremities.   There is good range of motion in both shoulders.    Skin:  No new skin lesions or rashes on the skin excluding the breast (see breast exam above).        Imagin2017  [facility]   Mammo  Dina Roche  BILATERAL SCREENING MAMMOGRAM WITH TOMOSYNTHESIS  Heterogeneously dense.  Finding 1: There are multiple oval masses of varying size seen in both breasts.  Most consistent with multiple cysts. No significant changed.  Finding 2: There are several round and punctate calcifications in both  breasts.  IMPRESSION:  BIRADS Category: 2, Benign.    04/24/2018  [facility]   Mammo  Dina Bosko  BILATERAL SCREENING MAMMOGRAM WITH TOMOSYNTHESIS  Heterogeneously dense.  Benign-appearing calcifications and benign-appearing oval masses again seen in both breasts.  BIRADS Category: 2, Benign.    09/19/2018  [facility]   Radiology  Dina Bos  The patient is seen for palpable lump or thickening in the right breast.  RIGHT DIAGNOSTIC MAMMOGRAM WITH TOMOSYNTHESIS  Heterogeneously dense.  Oval mass measuring 4 cm with circumscribed margins seen in the right breast at 12:00.  RIGHT BREAST ULTRASOUND  Ultrasound is suggestive of a large elongated debris containing cyst withy well defined, thin margins measuring 4 cm. this correlates to the palpable area. This has increased since the year.  IMPRESSION:  Benign-negative. Aspiration could be performed for patient comfort.  BIRADS Category: 2, Benign.    11/08/2019  [facility]   Mammo  Dina Bosko  BILATERAL SCREENING MAMMOGRAM WITH TOMOSYNTHESIS  Heterogeneously dense.  Equal density asymmetry measuring 5 millimeters seen in the MLO view only seen in the central region of the left breast.  BIRADS Category: 0    11/20/2019  [facility]   Radiology  [facility]  LEFT DIGITAL DIAGNOSTIC MAMMOGRAM WITH TOMOSYNTHESIS  Heterogeneously dense.  Finding 1: Additional evaluation asymmetry in the left breast, central. The area of asymmetric breast tissue presses out.  LEFT BREAST ULTRASOUND  Finding 1: Normal fibroglandular and adipose tissue throughout the central-breast.  Finding 2: Hypoechoic are with indistinct margins measuring 6 x 3 x 5 mm seen in the 1:30 region of the left breast located 4 centimeters from the nipple.  Finding 3: Complex solid cystic oval mass with circumscribed margins measuring 6 x 3 x 5 mm 1:30 region of the left breast located 1 centimeter from the nipple. Mural nodularity/solid component measures  2 mm.  Finding 4: Oval parallel mass with circumscribed margins measuring 16 x 8 x 12 mm seen sub-areolar region of the left breast.  IMPRESSION:  Finding 1: Normal tissue centrally and scattered cysts in the left breast is benign-negative.  Finding 2: Hypoechoic indistinct area in the 1:30 region of the left breast located 4 centimeters from the nipple requires additional evaluation. A physician-directed breast ultrasound is recommended at the time of the targeting for biopsy below.  Finding 3: Complex mass in the 1:320 region of the left breast located 1 centimeters from the nipple is suspicious. Ultrasound guided biopsy is recommended.  Finding 4: Mass in the sub-areolar region of the left breast is probably benign. Follow-up ultrasound exam in 6 months is recommended.  BIRADS Category: 4A    Patient's next bilateral screening [facility]  Follow-up left breast ultrasound May 18, 2020 [facility]:  1.  130 left breast biopsy clip is seen mass is no longer visualized.    2.  On  US, an oval parallel hypoechoic mass 1.6 cm retroareolar left with no significant change.  BI-RADS 3 recommend left breast ultrasound in 6 months at the time of bilateral screening mammogram.  Some incidental simple cyst in the left breast are benign as well.      Pathology:  12/10/19 [facility]  LEFT ULTRASOUND GUIDED BIOPSY  LETY RIVERA   1:30 left breast 12 gauge. Multiple cores. A total of 7 cores, a minicork tissue marker was placed at the biopsy site through the biopsy device. ADDENDUM 12/13/19: The cyst with mural nodule at the 1:30 position of the left breast that was biopsied re[presented benign fibrofatty breast tissue showing usual duct hyperplasia with microcalcifations, columnar cell change, sclerosing adenosis with microcalifications. Lobular microcalifications, apocrine metaplasia, cystically dilated and ecstatic ducts and hyalinized stromal fibrosis were also noted. The  findings are concordant. The biopsy site was marked with a “barrel” shaped clip. It is in good position. At the time of the ultrasound there was a small hypoechoic area at the 1:30 position at the left breast.     12/10/2019  [facility]     Pathology  Dina Roche  Left Breast Tissue at 1:30. Ultrasound guided biopsy:   Benign fibrofatty breast tissue showing usual duct hyperplasia with microcalifications, columnar cell change, sclerosing adenosis with microcalifications, lobular microcalifications, apocrine metaplasia, cystically dilated and Ecstatic ducts, and hyalinized stromal fibrosis.     Procedures:  Ultrasound-guided cyst aspiration 12-17-19  Indication:  symptomatic cyst  Location: RIGHT 9:00, 1 CFN  Consent:  The risks, benefits, and alternatives to the procedure were discussed with the patient, who understood and wished to proceed.  The risks described included, but were not limited to, bleeding, infection, pneumothorax, and cyst recurrence requiring percutaneous excisional biopsy.  Description of Procedure:   After the patient was positioned supine on the procedure table, I located the cyst using ultrasound.   The cyst was a smoothly marginated anechoic fluid collection wider than tall with good through-transmission and corner shadowing.  It measured in the radial dimension 2.14 x 1.13 cm and in the antiradial dimension 1.9 x 1.3 cm.  I prepped and draped the breast skin in sterile fashion.  I anesthetized the breast skin with 1% lidocaine with epinephrine.  I then anesthetized the underlying subcutaneous tissue and breast parenchyma surrounding the lesion with 1% lidocaine with epinephrine under ultrasound visualization and guidance. I then inserted a 21 G needle (attached to a syringe) into the cyst under ultrasound guidance and aspirated the cyst fluid until the cyst completely disappeared.  3 mL.  The fluid aspirated was typical cyst fluid, greenish-brown, nonbloody.  The fluid was discarded.  Manual  compression was held for 5 minutes and a bandaid applied.  Marker placed: none  Tolerance: The patient tolerated the procedure well.  Disposition: We will see her back in 3 months for a repeat exam and in office ultrasound.    Assessment:   Diagnosis Plan   1. Solitary cyst of right breast     2. Lump or mass in breast     3. Fibrocystic breast disease (FCBD), unspecified laterality  US Breast Left Limited    Mammo Screening Digital Tomosynthesis Bilateral With CAD   4. Mastodynia     5. Abnormal ultrasound of breast  US Breast Left Limited    Mammo Screening Digital Tomosynthesis Bilateral With CAD        1-2  RIGHT 9:00, 1 CFN- new mass as of 9-2019- noted due to pain  - bedside ultrasound showed 2.1 cm simple cyst- aspirated 12-17-19- mass gone on exam and pain resolved 6-2020    3  LEFt breast  1- 1:30, 4 CFN- 6mm hypoechoic region seen as density on mammogram- initially given BR 4, but at time of procedure for #2 below, felt to be a BR3- rec US 5-2020    2- 1:30, 1 CFN- 6mm complex cystc with mural nodule- BR4A- core biopsy - showed usual hyperplasia with calcifications, columnar cell change, sclerosing adenosis, lobular microcalcification, apocrine metaplasia, cystic dilated and ectatic ducts, stromal fibrosis. FU ultrasound 5-2020 showed no residual    5-  LEFT RA 1.6 cm mass- BR3- 5-2020 US BR3- due US       3-4  Bilateral diffuse breast pain associated with FCC, mild- currently asymptomatic 6-2020        Plan:  The patient goes by Mariaelena, her middle name.  Mariaelena and I reviewed her interval history, imaging, imaging reports and examination together today.    She is having no focal breast pain since we aspirated the dominant cyst.    Her imaging and examination are in good order today.  We discussed the nature of BI-RADS 3 designation and the recommendation to have a six-month follow-up ultrasound in the left breast at the time of her screening mammogram in November.  She was agreeable to this.  I will  arrange for a left breast focused ultrasound and [facility] and see her back after.      We discussed that she does have fibrocystic condition.  We discussed that this really describes a hormonal responsiveness of the breast tissue.  She has had a rapid weight loss of 20 pounds over the year and also has come off of exogenous hormones in June 2018 and is also perimenopausal.  All of these things affect her hormonal cycling.  She has had tenderness and cyst aspirations in the past.     With regards to the condition of fibrocystic change I did advise that she try sports bra, change her coffee 2 cups daily to decaffeinated, and try vitamin E 400 units once or twice per day.  We gave her handout on this today.  I asked her to continue her self breast exam and to call us in the interim with any concerns.  We discussed that she is likely to have additional palpable cyst in the future but not to assume that that is what it is and let us know if she has any new concerning mass.    Today she specifically comes for a new mass right lateral breast that on bedside ultrasound has a core length of a approximately 2 cm simple cyst.  We discussed aspiration to relieve her tenderness versus not aspirating and she wished to have this aspirated today.  She tolerated this well and we returned 3 cm of normal cyst fluid.  She felt immediate relief.  She also has microscopic fibrocystic change on her most recent biopsy and macrocysts on her imaging.  We discussed the nature of a BI-RADS 3 designation and the recommendation for a 6-month follow-up left breast ultrasound for the 130 lesion that was not sampled as well as the retroareolar solid mass.    [facility].    Her next routine screening mammogram will be due November 9, 2020.    We will follow-up the site of cyst aspiration on the right clinically when she  returns for her next visit as well.        Janette Fuller MD        We have spent 15 minutes in visit today, 10 in face to face .      Next Appointment:  Return for Next scheduled follow up, after imaging.      EMR Dragon/transcription disclaimer:    Much of this encounter note is an electronic transcription/translocation of spoken language to printed text.  The electronic translation of spoken language may permit erroneous, or at times, nonsensical words or phrases to be inadvertently transcribed.  Although I have reviewed the note from such areas, some may still exist.

## 2020-07-01 ENCOUNTER — TELEPHONE (OUTPATIENT)
Dept: FAMILY MEDICINE CLINIC | Facility: CLINIC | Age: 55
End: 2020-07-01

## 2020-07-01 NOTE — TELEPHONE ENCOUNTER
PATIENT IS REQUESTING A CALL BACK TO SEE WHERE SHE COULD GO TO GET THE COVID ANTIBODIES TEST         GOOD CONTACT NUMBER   [phone] (M)

## 2020-09-24 DIAGNOSIS — Z00.00 ANNUAL PHYSICAL EXAM: Primary | ICD-10-CM

## 2020-09-24 DIAGNOSIS — Z01.83 ENCOUNTER FOR BLOOD TYPING: ICD-10-CM

## 2020-09-29 LAB
ABO GROUP BLD: NORMAL
ALBUMIN SERPL-MCNC: 4.8 G/DL (ref 3.5–5.2)
ALBUMIN/GLOB SERPL: 2.8 G/DL
ALP SERPL-CCNC: 48 U/L (ref 39–117)
ALT SERPL-CCNC: 19 U/L (ref 1–33)
AST SERPL-CCNC: 21 U/L (ref 1–32)
BASOPHILS # BLD AUTO: 0.02 10*3/MM3 (ref 0–0.2)
BASOPHILS NFR BLD AUTO: 0.5 % (ref 0–1.5)
BILIRUB SERPL-MCNC: 0.7 MG/DL (ref 0–1.2)
BUN SERPL-MCNC: 11 MG/DL (ref 6–20)
BUN/CREAT SERPL: 11.3 (ref 7–25)
CALCIUM SERPL-MCNC: 9.4 MG/DL (ref 8.6–10.5)
CHLORIDE SERPL-SCNC: 103 MMOL/L (ref 98–107)
CHOLEST SERPL-MCNC: 175 MG/DL (ref 0–200)
CO2 SERPL-SCNC: 25.9 MMOL/L (ref 22–29)
CREAT SERPL-MCNC: 0.97 MG/DL (ref 0.57–1)
EOSINOPHIL # BLD AUTO: 0.21 10*3/MM3 (ref 0–0.4)
EOSINOPHIL NFR BLD AUTO: 4.8 % (ref 0.3–6.2)
ERYTHROCYTE [DISTWIDTH] IN BLOOD BY AUTOMATED COUNT: 12 % (ref 12.3–15.4)
GLOBULIN SER CALC-MCNC: 1.7 GM/DL
GLUCOSE SERPL-MCNC: 92 MG/DL (ref 65–99)
HCT VFR BLD AUTO: 40.8 % (ref 34–46.6)
HDLC SERPL-MCNC: 79 MG/DL (ref 40–60)
HGB BLD-MCNC: 13.6 G/DL (ref 12–15.9)
IMM GRANULOCYTES # BLD AUTO: 0.01 10*3/MM3 (ref 0–0.05)
IMM GRANULOCYTES NFR BLD AUTO: 0.2 % (ref 0–0.5)
LDLC SERPL CALC-MCNC: 82 MG/DL (ref 0–100)
LDLC/HDLC SERPL: 1.04 {RATIO}
LYMPHOCYTES # BLD AUTO: 1.64 10*3/MM3 (ref 0.7–3.1)
LYMPHOCYTES NFR BLD AUTO: 37.6 % (ref 19.6–45.3)
MCH RBC QN AUTO: 30.4 PG (ref 26.6–33)
MCHC RBC AUTO-ENTMCNC: 33.3 G/DL (ref 31.5–35.7)
MCV RBC AUTO: 91.1 FL (ref 79–97)
MONOCYTES # BLD AUTO: 0.38 10*3/MM3 (ref 0.1–0.9)
MONOCYTES NFR BLD AUTO: 8.7 % (ref 5–12)
NEUTROPHILS # BLD AUTO: 2.1 10*3/MM3 (ref 1.7–7)
NEUTROPHILS NFR BLD AUTO: 48.2 % (ref 42.7–76)
NRBC BLD AUTO-RTO: 0 /100 WBC (ref 0–0.2)
PLATELET # BLD AUTO: 230 10*3/MM3 (ref 140–450)
POTASSIUM SERPL-SCNC: 5 MMOL/L (ref 3.5–5.2)
PROT SERPL-MCNC: 6.5 G/DL (ref 6–8.5)
RBC # BLD AUTO: 4.48 10*6/MM3 (ref 3.77–5.28)
RH BLD: POSITIVE
SODIUM SERPL-SCNC: 138 MMOL/L (ref 136–145)
TRIGL SERPL-MCNC: 69 MG/DL (ref 0–150)
TSH SERPL DL<=0.005 MIU/L-ACNC: 2.54 UIU/ML (ref 0.27–4.2)
VLDLC SERPL CALC-MCNC: 13.8 MG/DL
WBC # BLD AUTO: 4.36 10*3/MM3 (ref 3.4–10.8)

## 2020-10-05 ENCOUNTER — OFFICE VISIT (OUTPATIENT)
Dept: FAMILY MEDICINE CLINIC | Facility: CLINIC | Age: 55
End: 2020-10-05

## 2020-10-05 VITALS
OXYGEN SATURATION: 99 % | SYSTOLIC BLOOD PRESSURE: 110 MMHG | HEART RATE: 99 BPM | TEMPERATURE: 98 F | WEIGHT: 114 LBS | HEIGHT: 63 IN | BODY MASS INDEX: 20.2 KG/M2 | RESPIRATION RATE: 16 BRPM | DIASTOLIC BLOOD PRESSURE: 72 MMHG

## 2020-10-05 DIAGNOSIS — Z00.00 ANNUAL PHYSICAL EXAM: Primary | ICD-10-CM

## 2020-10-05 DIAGNOSIS — Z23 NEED FOR INFLUENZA VACCINATION: ICD-10-CM

## 2020-10-05 DIAGNOSIS — Z23 NEED FOR TETANUS, DIPHTHERIA, AND ACELLULAR PERTUSSIS (TDAP) VACCINE: ICD-10-CM

## 2020-10-05 PROCEDURE — 90715 TDAP VACCINE 7 YRS/> IM: CPT | Performed by: PHYSICIAN ASSISTANT

## 2020-10-05 PROCEDURE — 99396 PREV VISIT EST AGE 40-64: CPT | Performed by: PHYSICIAN ASSISTANT

## 2020-10-05 PROCEDURE — 90471 IMMUNIZATION ADMIN: CPT | Performed by: PHYSICIAN ASSISTANT

## 2020-10-05 PROCEDURE — 90686 IIV4 VACC NO PRSV 0.5 ML IM: CPT | Performed by: PHYSICIAN ASSISTANT

## 2020-10-05 PROCEDURE — 90472 IMMUNIZATION ADMIN EACH ADD: CPT | Performed by: PHYSICIAN ASSISTANT

## 2020-10-05 NOTE — PROGRESS NOTES
"Subjective   Dina Roche is a 55 y.o. female presents for   Chief Complaint   Patient presents with   • Annual Exam       History of Present Illness     Dina \"Dorinda" is a 55-year-old female who presents for annual physical examination.  She has lost 1 pound since December 17, 2019.  Mariaelena states overall she is feeling well at office visit today.  Her bowel movements have been daily without dark black tarry stools.  Diet has been very healthy.  She has been cutting back on carbohydrates and fatty food.  Mariaelena exercises by walking at least 3-4 times weekly.  [facility].  Last Pap smear was in November 2019 with normal results.  Denied any chest pain, shortness of air, dizziness, fevers, chills, upper respiratory symptoms, abdominal pain, urinary symptoms, vaginal discharge or swelling of ankles.    The following portions of the patient's history were reviewed and updated as appropriate: allergies, current medications, past family history, past medical history, past social history, past surgical history and problem list.    Review of Systems   Constitutional: Negative.  Negative for chills, fatigue and fever.   HENT: Negative.    Eyes: Negative.    Respiratory: Negative.  Negative for cough, chest tightness, shortness of breath and wheezing.    Cardiovascular: Negative.    Gastrointestinal: Negative.  Negative for abdominal pain, constipation, diarrhea, nausea and vomiting.   Endocrine: Negative.    Genitourinary: Negative.    Musculoskeletal: Negative.    Skin: Negative.    Allergic/Immunologic: Negative.    Neurological: Negative.  Negative for dizziness, light-headedness and headaches.   Hematological: Negative.    Psychiatric/Behavioral: Negative.  Negative for sleep disturbance.   All other systems reviewed and are negative.        Vitals:    10/05/20 1446   BP: 110/72   Pulse: 99   Resp: 16   Temp: 98 °F (36.7 °C)   SpO2: 99%   Weight: 51.7 kg (114 " "lb)   Height: 160 cm (63\")     Wt Readings from Last 3 Encounters:   10/05/20 51.7 kg (114 lb)   06/01/20 52.6 kg (116 lb)   12/17/19 52.3 kg (115 lb 3.2 oz)     BP Readings from Last 3 Encounters:   10/05/20 110/72   06/01/20 116/76   12/17/19 121/84     Social History     Socioeconomic History   • Marital status:      Spouse name: Not on file   • Number of children: Not on file   • Years of education: Not on file   • Highest education level: Not on file   Tobacco Use   • Smoking status: Never Smoker   • Smokeless tobacco: Never Used   Substance and Sexual Activity   • Alcohol use: Yes     Alcohol/week: 4.0 standard drinks     Types: 4 Glasses of wine per week     Frequency: Never   • Drug use: No       No Known Allergies    Body mass index is 20.19 kg/m².    Objective   Physical Exam  Constitutional:       Appearance: Normal appearance. She is well-developed, well-groomed and normal weight.      Interventions: Face mask in place.   HENT:      Head: Normocephalic and atraumatic.      Jaw: There is normal jaw occlusion.      Right Ear: Hearing, tympanic membrane, ear canal and external ear normal.      Left Ear: Hearing, tympanic membrane, ear canal and external ear normal.      Nose: Nose normal.      Right Sinus: No maxillary sinus tenderness or frontal sinus tenderness.      Left Sinus: No maxillary sinus tenderness or frontal sinus tenderness.      Mouth/Throat:      Lips: Pink.      Mouth: Mucous membranes are moist.      Tongue: No lesions. Tongue does not deviate from midline.      Palate: No mass and lesions.      Pharynx: Oropharynx is clear. Uvula midline.      Tonsils: No tonsillar exudate or tonsillar abscesses.   Eyes:      General: Lids are normal.      Extraocular Movements: Extraocular movements intact.      Conjunctiva/sclera: Conjunctivae normal.      Pupils: Pupils are equal, round, and reactive to light.   Neck:      Musculoskeletal: Neck supple. No edema.      Thyroid: No thyroid mass, " thyromegaly or thyroid tenderness.      Vascular: Normal carotid pulses. No carotid bruit, hepatojugular reflux or JVD.      Trachea: Trachea and phonation normal.   Cardiovascular:      Rate and Rhythm: Normal rate and regular rhythm.      Pulses: Normal pulses.      Heart sounds: Normal heart sounds, S1 normal and S2 normal. No murmur.   Pulmonary:      Effort: Pulmonary effort is normal.      Breath sounds: Normal breath sounds and air entry.   Abdominal:      General: Abdomen is flat. Bowel sounds are normal.      Palpations: Abdomen is soft. There is no hepatomegaly.      Tenderness: There is no abdominal tenderness. There is no right CVA tenderness, left CVA tenderness, guarding or rebound. Negative signs include Block's sign, Rovsing's sign, McBurney's sign, psoas sign and obturator sign.   Musculoskeletal:      Right lower leg: No edema.      Left lower leg: No edema.   Lymphadenopathy:      Cervical: No cervical adenopathy.      Right cervical: No superficial, deep or posterior cervical adenopathy.     Left cervical: No superficial, deep or posterior cervical adenopathy.   Skin:     General: Skin is warm and dry.      Capillary Refill: Capillary refill takes less than 2 seconds.   Neurological:      General: No focal deficit present.      Mental Status: She is alert and oriented to person, place, and time.      Deep Tendon Reflexes:      Reflex Scores:       Patellar reflexes are 2+ on the right side and 2+ on the left side.  Psychiatric:         Attention and Perception: Attention and perception normal.         Mood and Affect: Mood and affect normal.         Speech: Speech normal.         Behavior: Behavior normal. Behavior is cooperative.         Thought Content: Thought content normal.         Cognition and Memory: Cognition and memory normal.         Judgment: Judgment normal.      I was wearing surgical mask during the entire office visit encounter.      Assessment/Plan   Dina was seen today for  annual exam.    Diagnoses and all orders for this visit:    Annual physical exam    Need for tetanus, diphtheria, and acellular pertussis (Tdap) vaccine  -     Tdap Vaccine Greater Than or Equal To 8yo IM    Need for influenza vaccination  -     FluLaval Quad >6 Months (8634-4030)      1.  Annual physical examination: I reviewed her lab work that was collected on December 28, 2020 with her at office visit today.  Fasting blood sugar was 92, cholesterol 175, triglycerides 69, HDL 79 and LDL was 82.  TSH was in normal range at 2.5.  Encouraged her to continue to eat healthy and exercise regularly.  2.  Need for updated Tdap and flu vaccination: She has given written consent to receive updated Tdap and flu shot today.  She will get updated shingles vaccination at her local pharmacy in the next month.      Patient Counseling:  --Nutrition: Stressed importance of moderation in sodium/caffeine intake, saturated fat and cholesterol.  Discussed caloric balance, sufficient intake of fresh fruits, vegetables, fiber,   calcium, iron.  --Discussed the new recommendation against daily use of baby aspirin for primary prevention in low risk patients.  --Exercise: Stressed the importance of regular exercise by incorporating into daily routine.    --Substance Abuse: Discussed cessation/primary prevention of tobacco, alcohol, or other drug use; driving or other dangerous activities under the influence.    --Dental health: Discussed importance of regular tooth brushing, flossing, and dental visits.  -- Suggested having eyes and vision checked if needed or past due.  --Immunizations reviewed.  Updated Tdap and flu shot given today.  --Discussed benefits of screening colonoscopy.  Colonoscopy is up-to-date.        ALEYDA [facility]  [address] [place] [id number]  Dept: [phone]  Dept Fax: [phone]  Loc: [phone]  Loc Fax:  [phone]           Orders Only on 09/24/2020   Component Date Value Ref Range Status   • WBC 09/28/2020 4.36  3.40 - 10.80 10*3/mm3 Final   • RBC 09/28/2020 4.48  3.77 - 5.28 10*6/mm3 Final   • Hemoglobin 09/28/2020 13.6  12.0 - 15.9 g/dL Final   • Hematocrit 09/28/2020 40.8  34.0 - 46.6 % Final   • MCV 09/28/2020 91.1  79.0 - 97.0 fL Final   • MCH 09/28/2020 30.4  26.6 - 33.0 pg Final   • MCHC 09/28/2020 33.3  31.5 - 35.7 g/dL Final   • RDW 09/28/2020 12.0* 12.3 - 15.4 % Final   • Platelets 09/28/2020 230  140 - 450 10*3/mm3 Final   • Neutrophil Rel % 09/28/2020 48.2  42.7 - 76.0 % Final   • Lymphocyte Rel % 09/28/2020 37.6  19.6 - 45.3 % Final   • Monocyte Rel % 09/28/2020 8.7  5.0 - 12.0 % Final   • Eosinophil Rel % 09/28/2020 4.8  0.3 - 6.2 % Final   • Basophil Rel % 09/28/2020 0.5  0.0 - 1.5 % Final   • Neutrophils Absolute 09/28/2020 2.10  1.70 - 7.00 10*3/mm3 Final   • Lymphocytes Absolute 09/28/2020 1.64  0.70 - 3.10 10*3/mm3 Final   • Monocytes Absolute 09/28/2020 0.38  0.10 - 0.90 10*3/mm3 Final   • Eosinophils Absolute 09/28/2020 0.21  0.00 - 0.40 10*3/mm3 Final   • Basophils Absolute 09/28/2020 0.02  0.00 - 0.20 10*3/mm3 Final   • Immature Granulocyte Rel % 09/28/2020 0.2  0.0 - 0.5 % Final   • Immature Grans Absolute 09/28/2020 0.01  0.00 - 0.05 10*3/mm3 Final   • nRBC 09/28/2020 0.0  0.0 - 0.2 /100 WBC Final   • Glucose 09/28/2020 92  65 - 99 mg/dL Final   • BUN 09/28/2020 11  6 - 20 mg/dL Final   • Creatinine 09/28/2020 0.97  0.57 - 1.00 mg/dL Final   • eGFR Non African Am 09/28/2020 60* >60 mL/min/1.73 Final   • eGFR African Am 09/28/2020 73  >60 mL/min/1.73 Final   • BUN/Creatinine Ratio 09/28/2020 11.3  7.0 - 25.0 Final   • Sodium 09/28/2020 138  136 - 145 mmol/L Final   • Potassium 09/28/2020 5.0  3.5 - 5.2 mmol/L Final   • Chloride 09/28/2020 103  98 - 107 mmol/L Final   • Total CO2 09/28/2020 25.9  22.0 - 29.0 mmol/L Final   • Calcium 09/28/2020 9.4  8.6 - 10.5 mg/dL Final   • Total Protein  09/28/2020 6.5  6.0 - 8.5 g/dL Final   • Albumin 09/28/2020 4.80  3.50 - 5.20 g/dL Final   • Globulin 09/28/2020 1.7  gm/dL Final   • A/G Ratio 09/28/2020 2.8  g/dL Final   • Total Bilirubin 09/28/2020 0.7  0.0 - 1.2 mg/dL Final   • Alkaline Phosphatase 09/28/2020 48  39 - 117 U/L Final   • AST (SGOT) 09/28/2020 21  1 - 32 U/L Final   • ALT (SGPT) 09/28/2020 19  1 - 33 U/L Final   • Total Cholesterol 09/28/2020 175  0 - 200 mg/dL Final   • Triglycerides 09/28/2020 69  0 - 150 mg/dL Final   • HDL Cholesterol 09/28/2020 79* 40 - 60 mg/dL Final   • VLDL Cholesterol Andrei 09/28/2020 13.8  mg/dL Final   • LDL Chol Calc (NIH) 09/28/2020 82  0 - 100 mg/dL Final   • LDL/HDL RATIO 09/28/2020 1.04   Final   • TSH 09/28/2020 2.540  0.270 - 4.200 uIU/mL Final   • ABO Type 09/28/2020 B   Final   • Rh Factor 09/28/2020 Positive   Final    Comment: Please note: Prior records for this patient's ABO / Rh type are not  available for additional verification.

## 2020-11-09 ENCOUNTER — APPOINTMENT (OUTPATIENT)
Dept: WOMENS IMAGING | Facility: HOSPITAL | Age: 55
End: 2020-11-09

## 2020-11-09 PROCEDURE — 77063 BREAST TOMOSYNTHESIS BI: CPT | Performed by: RADIOLOGY

## 2020-11-09 PROCEDURE — 77067 SCR MAMMO BI INCL CAD: CPT | Performed by: RADIOLOGY

## 2020-11-09 PROCEDURE — 76642 ULTRASOUND BREAST LIMITED: CPT | Performed by: RADIOLOGY

## 2020-11-13 ENCOUNTER — TELEPHONE (OUTPATIENT)
Dept: SURGERY | Facility: CLINIC | Age: 55
End: 2020-11-13

## 2020-11-13 NOTE — TELEPHONE ENCOUNTER
[facility] November 9, 2020 bilateral screening mammogram tomosynthesis.  The breasts are heterogenous Ralph dense.  Multiple similar low-density oval masses in both breast.    Left breast ultrasound: Follow-up mass left breast subareolar.  Oval parallel hypoechoic mass marked measuring 1.2 x 1 cm.  Decreased in size.  Impression BI-RADS 2

## 2020-11-20 ENCOUNTER — OFFICE VISIT (OUTPATIENT)
Dept: SURGERY | Facility: CLINIC | Age: 55
End: 2020-11-20

## 2020-11-20 VITALS
TEMPERATURE: 98.4 F | DIASTOLIC BLOOD PRESSURE: 79 MMHG | WEIGHT: 116 LBS | HEIGHT: 63 IN | OXYGEN SATURATION: 99 % | HEART RATE: 77 BPM | BODY MASS INDEX: 20.55 KG/M2 | SYSTOLIC BLOOD PRESSURE: 125 MMHG

## 2020-11-20 DIAGNOSIS — N60.01 SOLITARY CYST OF RIGHT BREAST: Primary | ICD-10-CM

## 2020-11-20 DIAGNOSIS — N63.0 LUMP OR MASS IN BREAST: ICD-10-CM

## 2020-11-20 DIAGNOSIS — N95.1 PERIMENOPAUSE: ICD-10-CM

## 2020-11-20 DIAGNOSIS — R92.8 ABNORMAL ULTRASOUND OF BREAST: ICD-10-CM

## 2020-11-20 DIAGNOSIS — N64.4 MASTODYNIA: ICD-10-CM

## 2020-11-20 DIAGNOSIS — N60.19 FIBROCYSTIC BREAST DISEASE (FCBD), UNSPECIFIED LATERALITY: ICD-10-CM

## 2020-11-20 PROCEDURE — 99213 OFFICE O/P EST LOW 20 MIN: CPT | Performed by: SURGERY

## 2020-11-20 NOTE — PROGRESS NOTES
Chief Complaint: Dina Roche is a 55 y.o. female who was seen in consultation at the request of No ref. provider found  for breast mass and abnormal breast imaging    History of Present Illness:  Patient presents with breast mass, abnormal breast imaging and breast pain.  She has had breast tenderness for several years.  She is perimenopausal as of her mid 40s.  She took Loestrin from her mid 40s through 2018 to regulate her menstrual cycle due to the irregularity.  She stopped this in 2018.  She intentionally lost 20 pounds over the past year via diet and exercise.  Her last period was 2019.  She tells me that yesterday she did have a little bit of spotting.  In September she did notice a worsening of her breast pain she noted an associated palpable mass right lateral breast at that time.  She says that it is some better but the mass and the pain still bother her.  She describes the pain as achy and several times per week.  She noted no other new masses, skin changes, nipple discharge, nipple changes prior to her most recent imaging.  Her most recent imaging includes the followin2018  [facility]   Mammo  Dina Roche  BILATERAL SCREENING MAMMOGRAM WITH TOMOSYNTHESIS  Heterogeneously dense.  Benign-appearing calcifications and benign-appearing oval masses again seen in both breasts.  BIRADS Category: 2, Benign.    2018  [facility]   Radiology  Dina Roche  The patient is seen for palpable lump or thickening in the right breast.  RIGHT DIAGNOSTIC MAMMOGRAM WITH TOMOSYNTHESIS  Heterogeneously dense.  Oval mass measuring 4 cm with circumscribed margins seen in the right breast at 12:00.  RIGHT BREAST ULTRASOUND  Ultrasound is suggestive of a large elongated debris containing cyst withy well defined, thin margins measuring 4 cm. this correlates to the palpable area. This has increased since the year.  IMPRESSION:  Benign-negative. Aspiration could  be performed for patient comfort.  BIRADS Category: 2, Benign.    11/08/2019  [facility]   Mammo  Dina Bosko  BILATERAL SCREENING MAMMOGRAM WITH TOMOSYNTHESIS  Heterogeneously dense.  Equal density asymmetry measuring 5 millimeters seen in the MLO view only seen in the central region of the left breast.  BIRADS Category: 0    11/20/2019  [facility]   Radiology  Dina Bosko  LEFT DIGITAL DIAGNOSTIC MAMMOGRAM WITH TOMOSYNTHESIS  Heterogeneously dense.  Finding 1: Additional evaluation asymmetry in the left breast, central. The area of asymmetric breast tissue presses out.  LEFT BREAST ULTRASOUND  Finding 1: Normal fibroglandular and adipose tissue throughout the central-breast.  Finding 2: Hypoechoic are with indistinct margins measuring 6 x 3 x 5 mm seen in the 1:30 region of the left breast located 4 centimeters from the nipple.  Finding 3: Complex solid cystic oval mass with circumscribed margins measuring 6 x 3 x 5 mm 1:30 region of the left breast located 1 centimeter from the nipple. Mural nodularity/solid component measures 2 mm.  Finding 4: Oval parallel mass with circumscribed margins measuring 16 x 8 x 12 mm seen sub-areolar region of the left breast.  IMPRESSION:  Finding 1: Normal tissue centrally and scattered cysts in the left breast is benign-negative.  Finding 2: Hypoechoic indistinct area in the 1:30 region of the left breast located 4 centimeters from the nipple requires additional evaluation. A physician-directed breast ultrasound is recommended at the time of the targeting for biopsy below.  Finding 3: Complex mass in the 1:320 region of the left breast located 1 centimeters from the nipple is suspicious. Ultrasound guided biopsy is recommended.  Finding 4: Mass in the sub-areolar region of the left breast is probably benign. Follow-up ultrasound exam in 6 months is recommended.  BIRADS Category: 4A      She had a biopsy on the following day that showed:    12/10/19 [facility]  LEFT ULTRASOUND GUIDED BIOPSY  LETY ROCHE   1:30 left breast 12 gauge. Multiple cores. A total of 7 cores, a minicork tissue marker was placed at the biopsy site through the biopsy device. ADDENDUM 12/13/19: The cyst with mural nodule at the 1:30 position of the left breast that was biopsied re[presented benign fibrofatty breast tissue showing usual duct hyperplasia with microcalcifations, columnar cell change, sclerosing adenosis with microcalifications. Lobular microcalifications, apocrine metaplasia, cystically dilated and ecstatic ducts and hyalinized stromal fibrosis were also noted. The findings are concordant. The biopsy site was marked with a “barrel” shaped clip. It is in good position. At the time of the ultrasound there was a small hypoechoic area at the 1:30 position at the left breast.     12/10/2019  [facility]     Pathology  Lety Roche  Left Breast Tissue at 1:30. Ultrasound guided biopsy:   Benign fibrofatty breast tissue showing usual duct hyperplasia with microcalifications, columnar cell change, sclerosing adenosis with microcalifications, lobular microcalifications, apocrine metaplasia, cystically dilated and Ecstatic ducts, and hyalinized stromal fibrosis.       She has had a right cyst aspiration in the past in 2014.  She has her uterus and ovaries, is perimenopausal, and takes nor hormones.  See above  Her family history includes the following: She has 1 daughter, 1 sister, 2 maternal aunts, 2 paternal aunts.  No family history of breast or ovarian cancer.      In the interim,  Lety Roche  has done well.  She has noted no changes in her breast exam. No new masses, skin changes, nipple changes, nipple discharge either breast.   She denies headache, bone pain, belly pain, cough, changes in vision or gait.    Her most recent imaging includes the following:  [facility]  Follow-up left breast  ultrasound [facility], [facility]:  1.  1:30 left breast biopsy clip is seen mass is no longer visualized.    2.  On  US, an oval parallel hypoechoic mass 1.6 cm retroareolar left with no significant change.      BI-RADS 3 recommend left breast ultrasound in 6 months at the time of bilateral screening mammogram.  Some incidental simple cyst in the left breast are benign as well.      Interval History:  In the interim,  Dina Roche  has done well.  She has noted no changes in her breast exam. No new masses, skin changes, nipple changes, nipple discharge either breast.   She is taking the 400 units of vitamin E daily and reports no trouble with her breast pain.  The area that we aspirated on the right is no longer symptomatic.    She denies headache, bone pain, belly pain, cough, changes in vision or gait.    Her most recent imaging includes the following:  [facility] screening mammogram tomosynthesis.  The breasts are heterogenous Ralph dense.  Multiple similar low-density oval masses in both breast.     Left breast ultrasound: Follow-up mass left breast subareolar.  Oval parallel hypoechoic mass marked measuring 1.2 x 1 cm.  Decreased in size.  Impression BI-RADS 2    She is here to review        Review of Systems:  Review of Systems   Constitutional: Positive for unexpected weight change (1 lb wt gain  ).   All other systems reviewed and are negative.       Past Medical and Surgical History:  Breast Biopsy History:  Patient has had the following breast biopsies:LEFT BREAST 1:30 BENIGN   Breast Cancer HIstory:  Patient does not have a past medical history of breast cancer.  Breast Operations, and year:  NONE   Obstetric/Gynecologic History:  Age menstrual periods began: 15  Patient is premenopausal, first day of last period: 7/12/19  Number of pregnancies: 4  Number of live births: 2  Number of abortions or miscarriages: 2  Age of delivery of first child:  "  Patient did not breast feed.  Length of time taking birth control pills: 15-20 YRS   Patient has never taken hormone replacement  PATIENT HAS BOTH OVARIES AND UTERUS.     Past Surgical History:   Procedure Laterality Date   • BREAST BIOPSY     • VARICOSE VEIN SURGERY         Past Medical History:   Diagnosis Date   • Allergic    • Eczema    • Injury of back    • Scoliosis    • Tennis elbow    • Varicose veins of bilateral lower extremities with other complications 2010    Dr. Newman       Prior Hospitalizations, other than for surgery or childbirth, and year:  NONE     Social History     Socioeconomic History   • Marital status:      Spouse name: Not on file   • Number of children: Not on file   • Years of education: Not on file   • Highest education level: Not on file   Tobacco Use   • Smoking status: Never Smoker   • Smokeless tobacco: Never Used   Substance and Sexual Activity   • Alcohol use: Yes     Alcohol/week: 4.0 standard drinks     Types: 4 Glasses of wine per week     Frequency: Never   • Drug use: No     Patient is .  Patient is unemployed.  Patient drinks 2 servings of caffeine per day.    Family History:  History reviewed. No pertinent family history.    Vital Signs:  /79 (BP Location: Right arm, Patient Position: Sitting, Cuff Size: Adult)   Pulse 77   Temp 98.4 °F (36.9 °C) (Temporal)   Ht 160 cm (63\")   Wt 52.6 kg (116 lb)   SpO2 99%   BMI 20.55 kg/m²      Medications:    Current Outpatient Medications:   •  BL EVENING PRIMROSE OIL PO, Take  by mouth., Disp: , Rfl:   •  cetirizine (zyrTEC) 10 MG tablet, Take 10 mg by mouth As Needed for Allergies., Disp: , Rfl:   •  Crisaborole (EUCRISA) 2 % ointment, Apply 1 application topically 2 (Two) Times a Day As Needed (rash)., Disp: 60 g, Rfl: 2  •  magnesium oxide (MAG-OX) 400 MG tablet, Take 400 mg by mouth Daily., Disp: , Rfl:   •  melatonin 5 MG tablet tablet, Take 5 mg by mouth., Disp: , Rfl:   •  meloxicam (MOBIC) 15 MG " "tablet, 15 mg As Needed (as needed-rare)., Disp: , Rfl:   •  methocarbamol (ROBAXIN) 500 MG tablet, Take 1 tablet by mouth 3 (Three) Times a Day. As needed for back pain (Patient taking differently: Take 500 mg by mouth As Needed (as needed rare). As needed for back pain), Disp: 90 tablet, Rfl: 3  •  mometasone (ELOCON) 0.1 % cream, Apply  topically to the appropriate area as directed Daily., Disp: 45 g, Rfl: 1  •  Multiple Vitamin (MULTI-VITAMIN DAILY PO), Take  by mouth., Disp: , Rfl:   •  Probiotic capsule, Take  by mouth., Disp: , Rfl:   •  vitamin E 400 UNIT capsule, Take 400 Units by mouth Daily., Disp: , Rfl:      Allergies:  No Known Allergies    Physical Examination:  /79 (BP Location: Right arm, Patient Position: Sitting, Cuff Size: Adult)   Pulse 77   Temp 98.4 °F (36.9 °C) (Temporal)   Ht 160 cm (63\")   Wt 52.6 kg (116 lb)   SpO2 99%   BMI 20.55 kg/m²   General Appearance:  Patient is in no distress.  She is well kept and has an thin build.   Psychiatric:  Patient with appropriate mood and affect. Alert and oriented to self, time, and place.    Breast, RIGHT:  small sized, 32A, symmetric with the contralateral side.  Breast skin is without erythema, edema, rashes.  There are no visible abnormalities upon inspection during the arm-raising maneuver or with hands on hips in the sitting position. There is no nipple retraction, discharge or nipple/areolar skin changes.  There are no  masses palpable in the sitting or supine positions.    Breast, LEFT:  small sized, 32 A, symmetric with the contralateral side.  Breast skin is without erythema, edema, rashes.  There are no visible abnormalities upon inspection during the arm-raising maneuver or with hands on hips in the sitting position. There is no nipple retraction, discharge or nipple/areolar skin changes.There are no masses palpable in the sitting or supine positions.     Lymphatic:  There is no axillary, cervical, infraclavicular, or " supraclavicular adenopathy bilaterally.  Eyes:  Pupils are round and reactive to light.  Cardiovascular:  Heart rate and rhythm are regular.  Respiratory:  Lungs are clear bilaterally with no crackles or wheezes in any lung field.  Gastrointestinal:  Abdomen is soft, nondistended, and nontender.     Musculoskeletal:  Good strength in all 4 extremities.   There is good range of motion in both shoulders.    Skin:  No new skin lesions or rashes on the skin excluding the breast (see breast exam above).        Imagin2017  [facility]   Mammo  Dina Bosko  BILATERAL SCREENING MAMMOGRAM WITH TOMOSYNTHESIS  Heterogeneously dense.  Finding 1: There are multiple oval masses of varying size seen in both breasts.  Most consistent with multiple cysts. No significant changed.  Finding 2: There are several round and punctate calcifications in both breasts.  IMPRESSION:  BIRADS Category: 2, Benign.    2018  [facility]   Mammo  Dina Bosko  BILATERAL SCREENING MAMMOGRAM WITH TOMOSYNTHESIS  Heterogeneously dense.  Benign-appearing calcifications and benign-appearing oval masses again seen in both breasts.  BIRADS Category: 2, Benign.    2018  [facility]   Radiology  [facility]  The patient is seen for palpable lump or thickening in the right breast.  RIGHT DIAGNOSTIC MAMMOGRAM WITH TOMOSYNTHESIS  Heterogeneously dense.  Oval mass measuring 4 cm with circumscribed margins seen in the right breast at 12:00.  RIGHT BREAST ULTRASOUND  Ultrasound is suggestive of a large elongated debris containing cyst withy well defined, thin margins measuring 4 cm. this correlates to the palpable area. This has increased since the year.  IMPRESSION:  Benign-negative. Aspiration could be performed for patient comfort.  BIRADS Category: 2, Benign.    2019  [facility]   Mammo  Dina Bosko  BILATERAL SCREENING MAMMOGRAM WITH TOMOSYNTHESIS  Heterogeneously dense.  Equal  density asymmetry measuring 5 millimeters seen in the MLO view only seen in the central region of the left breast.  BIRADS Category: 0    11/20/2019  [facility]   Radiology  Dina Roche  LEFT DIGITAL DIAGNOSTIC MAMMOGRAM WITH TOMOSYNTHESIS  Heterogeneously dense.  Finding 1: Additional evaluation asymmetry in the left breast, central. The area of asymmetric breast tissue presses out.  LEFT BREAST ULTRASOUND  Finding 1: Normal fibroglandular and adipose tissue throughout the central-breast.  Finding 2: Hypoechoic are with indistinct margins measuring 6 x 3 x 5 mm seen in the 1:30 region of the left breast located 4 centimeters from the nipple.  Finding 3: Complex solid cystic oval mass with circumscribed margins measuring 6 x 3 x 5 mm 1:30 region of the left breast located 1 centimeter from the nipple. Mural nodularity/solid component measures 2 mm.  Finding 4: Oval parallel mass with circumscribed margins measuring 16 x 8 x 12 mm seen sub-areolar region of the left breast.  IMPRESSION:  Finding 1: Normal tissue centrally and scattered cysts in the left breast is benign-negative.  Finding 2: Hypoechoic indistinct area in the 1:30 region of the left breast located 4 centimeters from the nipple requires additional evaluation. A physician-directed breast ultrasound is recommended at the time of the targeting for biopsy below.  Finding 3: Complex mass in the 1:320 region of the left breast located 1 centimeters from the nipple is suspicious. Ultrasound guided biopsy is recommended.  Finding 4: Mass in the sub-areolar region of the left breast is probably benign. Follow-up ultrasound exam in 6 months is recommended.  BIRADS Category: 4A    [facility]'s [facility] bilateral [facility]  Follow-up left breast ultrasound [facility] 18, 2020 [facility]:  1.  130 left breast biopsy clip is seen mass is no longer visualized.    2.  On  US, an oval parallel hypoechoic  mass 1.6 cm retroareolar left with no significant change.  BI-RADS 3 recommend left breast ultrasound in 6 months at the time of bilateral screening mammogram.  Some incidental simple cyst in the left breast are benign as well.    [facility] bilateral screening mammogram tomosynthesis.  The breasts are heterogenous Ralph dense.  Multiple similar low-density oval masses in both breast.     Left breast ultrasound: Follow-up mass left breast subareolar.  Oval parallel hypoechoic mass marked measuring 1.2 x 1 cm.  Decreased in size.  Impression BI-RADS 2        Pathology:  12/10/19 [facility]  LEFT ULTRASOUND GUIDED BIOPSY  LETY BOSKO   1:30 left breast 12 gauge. Multiple cores. A total of 7 cores, a minicork tissue marker was placed at the biopsy site through the biopsy device. ADDENDUM 12/13/19: The cyst with mural nodule at the 1:30 position of the left breast that was biopsied re[presented benign fibrofatty breast tissue showing usual duct hyperplasia with microcalcifations, columnar cell change, sclerosing adenosis with microcalifications. Lobular microcalifications, apocrine metaplasia, cystically dilated and ecstatic ducts and hyalinized stromal fibrosis were also noted. The findings are concordant. The biopsy site was marked with a “barrel” shaped clip. It is in good position. At the time of the ultrasound there was a small hypoechoic area at the 1:30 position at the left breast.     12/10/2019  Merg[facility] with [facility]     Pathology  Lety Bosko  Left Breast Tissue at 1:30. Ultrasound guided biopsy:   Benign fibrofatty breast tissue showing usual duct hyperplasia with microcalifications, columnar cell change, sclerosing adenosis with microcalifications, lobular microcalifications, apocrine metaplasia, cystically dilated and Ecstatic ducts, and hyalinized stromal fibrosis.     Procedures:  Ultrasound-guided cyst aspiration 12-17-19  Indication:  symptomatic cyst  Location: RIGHT 9:00, 1 CFN  Consent:   The risks, benefits, and alternatives to the procedure were discussed with the patient, who understood and wished to proceed.  The risks described included, but were not limited to, bleeding, infection, pneumothorax, and cyst recurrence requiring percutaneous excisional biopsy.  Description of Procedure:   After the patient was positioned supine on the procedure table, I located the cyst using ultrasound.   The cyst was a smoothly marginated anechoic fluid collection wider than tall with good through-transmission and corner shadowing.  It measured in the radial dimension 2.14 x 1.13 cm and in the antiradial dimension 1.9 x 1.3 cm.  I prepped and draped the breast skin in sterile fashion.  I anesthetized the breast skin with 1% lidocaine with epinephrine.  I then anesthetized the underlying subcutaneous tissue and breast parenchyma surrounding the lesion with 1% lidocaine with epinephrine under ultrasound visualization and guidance. I then inserted a 21 G needle (attached to a syringe) into the cyst under ultrasound guidance and aspirated the cyst fluid until the cyst completely disappeared.  3 mL.  The fluid aspirated was typical cyst fluid, greenish-brown, nonbloody.  The fluid was discarded.  Manual compression was held for 5 minutes and a bandaid applied.  Marker placed: none  Tolerance: The patient tolerated the procedure well.  Disposition: We will see her back in 3 months for a repeat exam and in office ultrasound.    Assessment:   Diagnosis Plan   1. Solitary cyst of right breast     2. Lump or mass in breast     3. Fibrocystic breast disease (FCBD), unspecified laterality     4. Mastodynia     5. Abnormal ultrasound of breast     6. Perimenopause          1-2  RIGHT 9:00, 1 CFN- new mass as of 9-2019- noted due to pain  - bedside ultrasound showed 2.1 cm simple cyst- aspirated 12-17-19- mass gone on exam and pain resolved 6-2020    3  LEFt breast  1- 1:30, 4 CFN- 6mm hypoechoic region seen as density on  mammogram- initially given BR 4, but at time of procedure for #2 below, felt to be a BR3- rec US 5-2020-no mass at 130 on the May 2020 imaging.    2- 1:30, 1 CFN- 6mm complex cystc with mural nodule- BR4A- core biopsy - showed usual hyperplasia with calcifications, columnar cell change, sclerosing adenosis, lobular microcalcification, apocrine metaplasia, cystic dilated and ectatic ducts, stromal fibrosis. FU ultrasound 5-2020 showed no residual    5-  LEFT RA 1.6 cm mass- BR3- 5-2020 US BR3- due US -lesion decreased in size on November 2020 imaging, BI-RADS 2      3-4  Bilateral diffuse breast pain associated with FCC, mild-symptoms resolved after aspiration of focal pain right breast and also after taking 400 units of vitamin D daily.        Plan:  The patient goes by Mariaelena, her middle name.  Mariaelena and I reviewed her interval history, imaging, imaging reports and examination together today.    She is having no focal breast pain since we aspirated the dominant cyst.  She is having no global breast pain since starting the vitamin E 400 units a day.    Her imaging and examination are in good order today.    We reviewed the areas of concern namely the left breast 130 and left breast retroareolar areas on her imaging.  These are in good order on imaging and her exam is benign.  [facility]  I have given her this reminder today.    She did mention that she is having vaginal dryness related to the perimenopause.  I asked for her to discuss with her gynecologist the possibility of vaginal estrogen.  She does still have her uterus in place.  We also discussed the option of coconut oil.      I have not given her a routine follow-up in our office.  I asked her to continue her self breast exam and to call us in the future with any concerns and we would be happy to see her back.      Janette Fuller MD      We have spent 15 minutes in visit today, 9  in face to face .      Next Appointment:  Return for any future concerns.      EMR Dragon/transcription disclaimer:    Much of this encounter note is an electronic transcription/translocation of spoken language to printed text.  The electronic translation of spoken language may permit erroneous, or at times, nonsensical words or phrases to be inadvertently transcribed.  Although I have reviewed the note from such areas, some may still exist.

## 2020-12-11 ENCOUNTER — TREATMENT (OUTPATIENT)
Dept: PHYSICAL THERAPY | Facility: CLINIC | Age: 55
End: 2020-12-11

## 2020-12-11 DIAGNOSIS — M54.50 CHRONIC BILATERAL LOW BACK PAIN WITHOUT SCIATICA: ICD-10-CM

## 2020-12-11 DIAGNOSIS — S39.012D STRAIN OF LUMBAR REGION, SUBSEQUENT ENCOUNTER: ICD-10-CM

## 2020-12-11 DIAGNOSIS — M54.50 ACUTE MIDLINE LOW BACK PAIN WITHOUT SCIATICA: Primary | ICD-10-CM

## 2020-12-11 DIAGNOSIS — G89.29 CHRONIC BILATERAL LOW BACK PAIN WITHOUT SCIATICA: ICD-10-CM

## 2020-12-11 PROCEDURE — DRYNDL PR CUSTOM DRY NEEDLING SELF PAY: Performed by: PHYSICAL THERAPIST

## 2020-12-11 NOTE — PROGRESS NOTES
Physical Therapy Daily Progress Note        Patient: Dina Roche   : 1965  Diagnosis/ICD-10 Code:  Acute midline low back pain without sciatica [M54.5]  Referring practitioner: No ref. provider found  Date of Initial Visit: 2020  Today's Date: 2020  Patient seen for 2 sessions         Dina Roche reports: I was bending over and reaching into a cabinet last Friday and felt like a rubber band snap in my low back have been doing the stretches from last time but would like to try the Dry Needling.         Subjective     Objective          Palpation   Left   Hypertonic in the iliopsoas, lumbar interspinals, lumbar paraspinals and quadratus lumborum.   Tenderness of the iliopsoas, lumbar interspinals, lumbar paraspinals and quadratus lumborum.     Right   Hypertonic in the iliopsoas, lumbar interspinals, lumbar paraspinals and quadratus lumborum. Tenderness of the iliopsoas, lumbar interspinals, lumbar paraspinals and quadratus lumborum.       See Exercise, Manual, and Modality Logs for complete treatment.       Assessment & Plan     Assessment  Assessment details: Patient presents with increased muscle tone bilateral lumbar PVM's and quadratus. Improved muscle tone, and mobility after Dry Needling. Educated patient to apply moist heat again tonight. Will continue to see patient for Dry Needling based on her discretion.         Progress per Plan of Care           Manual Therapy:         mins  [id number];  Therapeutic Exercise:         mins  [id number];     Neuromuscular Gabe:       mins  06387;    Therapeutic Activity:          mins  58000;     Gait Training:           mins  15750;     Ultrasound:          mins  35713;    Electrical Stimulation:         mins  68638 ( );  Dry Needling     15     mins self-pay    Timed Treatment:      mins   Total Treatment:     25   mins    Marce Robertson, PT  Physical Therapist

## 2021-09-30 DIAGNOSIS — Z11.59 NEED FOR HEPATITIS C SCREENING TEST: ICD-10-CM

## 2021-09-30 DIAGNOSIS — Z00.00 ANNUAL PHYSICAL EXAM: Primary | ICD-10-CM

## 2021-09-30 DIAGNOSIS — Z13.220 ENCOUNTER FOR SCREENING FOR LIPID DISORDER: ICD-10-CM

## 2021-10-07 LAB
ALBUMIN SERPL-MCNC: 4.9 G/DL (ref 3.5–5.2)
ALBUMIN/GLOB SERPL: 2.3 G/DL
ALP SERPL-CCNC: 50 U/L (ref 39–117)
ALT SERPL-CCNC: 14 U/L (ref 1–33)
AST SERPL-CCNC: 17 U/L (ref 1–32)
BASOPHILS # BLD AUTO: 0.02 10*3/MM3 (ref 0–0.2)
BASOPHILS NFR BLD AUTO: 0.5 % (ref 0–1.5)
BILIRUB SERPL-MCNC: 0.4 MG/DL (ref 0–1.2)
BUN SERPL-MCNC: 15 MG/DL (ref 6–20)
BUN/CREAT SERPL: 18.1 (ref 7–25)
CALCIUM SERPL-MCNC: 10 MG/DL (ref 8.6–10.5)
CHLORIDE SERPL-SCNC: 102 MMOL/L (ref 98–107)
CHOLEST SERPL-MCNC: 204 MG/DL (ref 0–200)
CO2 SERPL-SCNC: 25.6 MMOL/L (ref 22–29)
CREAT SERPL-MCNC: 0.83 MG/DL (ref 0.57–1)
EOSINOPHIL # BLD AUTO: 0.19 10*3/MM3 (ref 0–0.4)
EOSINOPHIL NFR BLD AUTO: 5.2 % (ref 0.3–6.2)
ERYTHROCYTE [DISTWIDTH] IN BLOOD BY AUTOMATED COUNT: 11.7 % (ref 12.3–15.4)
GLOBULIN SER CALC-MCNC: 2.1 GM/DL
GLUCOSE SERPL-MCNC: 93 MG/DL (ref 65–99)
HCT VFR BLD AUTO: 42.1 % (ref 34–46.6)
HCV AB S/CO SERPL IA: <0.1 S/CO RATIO (ref 0–0.9)
HDLC SERPL-MCNC: 90 MG/DL (ref 40–60)
HGB BLD-MCNC: 14.1 G/DL (ref 12–15.9)
IMM GRANULOCYTES # BLD AUTO: 0.01 10*3/MM3 (ref 0–0.05)
IMM GRANULOCYTES NFR BLD AUTO: 0.3 % (ref 0–0.5)
LDLC SERPL CALC-MCNC: 103 MG/DL (ref 0–100)
LDLC/HDLC SERPL: 1.14 {RATIO}
LYMPHOCYTES # BLD AUTO: 1.49 10*3/MM3 (ref 0.7–3.1)
LYMPHOCYTES NFR BLD AUTO: 40.9 % (ref 19.6–45.3)
MCH RBC QN AUTO: 30.5 PG (ref 26.6–33)
MCHC RBC AUTO-ENTMCNC: 33.5 G/DL (ref 31.5–35.7)
MCV RBC AUTO: 91.1 FL (ref 79–97)
MONOCYTES # BLD AUTO: 0.32 10*3/MM3 (ref 0.1–0.9)
MONOCYTES NFR BLD AUTO: 8.8 % (ref 5–12)
NEUTROPHILS # BLD AUTO: 1.61 10*3/MM3 (ref 1.7–7)
NEUTROPHILS NFR BLD AUTO: 44.3 % (ref 42.7–76)
NRBC BLD AUTO-RTO: 0 /100 WBC (ref 0–0.2)
PLATELET # BLD AUTO: 241 10*3/MM3 (ref 140–450)
POTASSIUM SERPL-SCNC: 4.9 MMOL/L (ref 3.5–5.2)
PROT SERPL-MCNC: 7 G/DL (ref 6–8.5)
RBC # BLD AUTO: 4.62 10*6/MM3 (ref 3.77–5.28)
SODIUM SERPL-SCNC: 140 MMOL/L (ref 136–145)
TRIGL SERPL-MCNC: 58 MG/DL (ref 0–150)
TSH SERPL DL<=0.005 MIU/L-ACNC: 2.64 UIU/ML (ref 0.27–4.2)
VLDLC SERPL CALC-MCNC: 11 MG/DL (ref 5–40)
WBC # BLD AUTO: 3.64 10*3/MM3 (ref 3.4–10.8)

## 2021-11-05 ENCOUNTER — OFFICE VISIT (OUTPATIENT)
Dept: FAMILY MEDICINE CLINIC | Facility: CLINIC | Age: 56
End: 2021-11-05

## 2021-11-05 VITALS
HEART RATE: 66 BPM | SYSTOLIC BLOOD PRESSURE: 112 MMHG | WEIGHT: 119 LBS | OXYGEN SATURATION: 98 % | RESPIRATION RATE: 13 BRPM | HEIGHT: 63 IN | DIASTOLIC BLOOD PRESSURE: 68 MMHG | TEMPERATURE: 97.3 F | BODY MASS INDEX: 21.09 KG/M2

## 2021-11-05 DIAGNOSIS — Z23 NEED FOR INFLUENZA VACCINATION: ICD-10-CM

## 2021-11-05 DIAGNOSIS — Z00.00 ANNUAL PHYSICAL EXAM: Primary | ICD-10-CM

## 2021-11-05 DIAGNOSIS — E78.00 HYPERCHOLESTEREMIA: ICD-10-CM

## 2021-11-05 PROCEDURE — 90471 IMMUNIZATION ADMIN: CPT | Performed by: PHYSICIAN ASSISTANT

## 2021-11-05 PROCEDURE — 99396 PREV VISIT EST AGE 40-64: CPT | Performed by: PHYSICIAN ASSISTANT

## 2021-11-05 PROCEDURE — 90686 IIV4 VACC NO PRSV 0.5 ML IM: CPT | Performed by: PHYSICIAN ASSISTANT

## 2021-11-05 NOTE — PROGRESS NOTES
"Chief Complaint  Annual Exam    Subjective          Dina Roche presents to [facility]  History of Present Illness  Suzy,\"Mariaelena\", is a 56-year-old female who presents for annual physical examination.  Mariaelena states she is doing well today.  Has had her COVID immunization as well as shingles.  Would like her updated flu shot.  Overall she is feeling well.  Does get Raynaud's when her hands get cold.  Has been trying to wear gloves.  Denied any fevers, chills, upper respiratory symptoms, chest pain, shortness of air, cough, wheezing, abdominal pain, urinary symptoms or swelling of ankles.  Has upcoming appointment with gynecologist in January 2022.  Bowel movements have been 2 or 3 times weekly.  States this is normal for her.  She has been trying to increase fiber and water intake.  Denied any dark black tarry stools.     Objective   Vital Signs:   /68   Pulse 66   Temp 97.3 °F (36.3 °C)   Resp 13   Ht 160 cm (62.99\")   Wt 54 kg (119 lb)   SpO2 98%   BMI 21.09 kg/m²     Physical Exam  Vitals and nursing note reviewed.   Constitutional:       Appearance: Normal appearance. She is well-developed, well-groomed and normal weight.      Interventions: Face mask in place.   HENT:      Head: Normocephalic and atraumatic.      Jaw: There is normal jaw occlusion.      Right Ear: Hearing, tympanic membrane, ear canal and external ear normal.      Left Ear: Hearing, tympanic membrane, ear canal and external ear normal.      Nose: Nose normal.      Right Sinus: No maxillary sinus tenderness or frontal sinus tenderness.      Left Sinus: No maxillary sinus tenderness or frontal sinus tenderness.      Mouth/Throat:      Lips: Pink.      Mouth: Mucous membranes are moist.      Dentition: Normal dentition.      Tongue: No lesions. Tongue does not deviate from midline.      Palate: No mass and lesions.      Pharynx: Oropharynx is clear. Uvula midline.      Tonsils: No tonsillar " exudate.   Eyes:      General: Lids are normal.      Conjunctiva/sclera: Conjunctivae normal.      Pupils: Pupils are equal, round, and reactive to light.   Neck:      Thyroid: No thyroid mass, thyromegaly or thyroid tenderness.      Vascular: No carotid bruit.      Trachea: Trachea and phonation normal. No tracheal tenderness.   Cardiovascular:      Rate and Rhythm: Normal rate and regular rhythm.      Pulses: Normal pulses.      Heart sounds: Normal heart sounds, S1 normal and S2 normal. No murmur heard.      Pulmonary:      Effort: Pulmonary effort is normal.      Breath sounds: Normal breath sounds and air entry.   Abdominal:      General: Bowel sounds are normal.      Palpations: Abdomen is soft.      Tenderness: There is no abdominal tenderness. There is no right CVA tenderness, left CVA tenderness, guarding or rebound. Negative signs include Block's sign, Rovsing's sign, McBurney's sign, psoas sign and obturator sign.   Musculoskeletal:      Cervical back: Neck supple.      Right lower leg: No edema.      Left lower leg: No edema.   Lymphadenopathy:      Cervical: No cervical adenopathy.      Right cervical: No superficial, deep or posterior cervical adenopathy.     Left cervical: No superficial, deep or posterior cervical adenopathy.   Skin:     General: Skin is warm and dry.      Capillary Refill: Capillary refill takes less than 2 seconds.   Neurological:      Mental Status: She is alert and oriented to person, place, and time.      Deep Tendon Reflexes:      Reflex Scores:       Patellar reflexes are 2+ on the right side and 2+ on the left side.  Psychiatric:         Attention and Perception: Attention and perception normal.         Mood and Affect: Mood and affect normal.         Speech: Speech normal.         Behavior: Behavior is cooperative.         Thought Content: Thought content normal.         Cognition and Memory: Cognition and memory normal.         Judgment: Judgment normal.     I wore a  facial mask, face shield, and gloves during this patient encounter.  Patient also wearing a surgical mask.  Hand hygiene performed before and after seeing the patient.     Result Review :     Common labs    Common Labsle 10/6/21 10/6/21 10/6/21    0944 0944 0944   Glucose  93    BUN  15    Creatinine  0.83    eGFR Non  Am  72    eGFR African Am  87    Sodium  140    Potassium  4.9    Chloride  102    Calcium  10.0    Total Protein  7.0    Albumin  4.90    Total Bilirubin  0.4    Alkaline Phosphatase  50    AST (SGOT)  17    ALT (SGPT)  14    WBC 3.64     Hemoglobin 14.1     Hematocrit 42.1     Platelets 241     Total Cholesterol   204 (A)   Triglycerides   58   HDL Cholesterol   90 (A)   LDL Cholesterol    103 (A)   (A) Abnormal value       Comments are available for some flowsheets but are not being displayed.                     Assessment and Plan    Diagnoses and all orders for this visit:    1. Annual physical exam (Primary)    2. Need for influenza vaccination  -     Cancel: FluLaval/Fluarix/Fluzone >6 Months  -     FluLaval/Fluarix/Fluzone >6 Months    3. Hypercholesteremia    1.  Annual physical examination with hypercholesterolemia: Her cholesterol was slightly elevated.  Have discussed decreasing her fatty food, cheese and eggs intake.  Will reevaluate in 1 year.  She will increase exercise as well.  2.  Need for influenza vaccination: Mariaelena has given written consent to receive flu shot today.  Will obtain her prior immunizations for shingles from her pharmacy. Mariaelena tolerated the flu immunization without complications.  Patient Counseling:  --Nutrition: Stressed importance of moderation in sodium/caffeine intake, saturated fat and cholesterol.  Discussed caloric balance, sufficient intake of fresh fruits, vegetables, fiber,   calcium, iron.  --Discussed the new recommendation against daily use of baby aspirin for primary prevention in low risk patients.  --Exercise: Stressed the importance of  regular exercise by incorporating into daily routine.    --Substance Abuse: Discussed cessation/primary prevention of tobacco, alcohol, or other drug use; driving or other dangerous activities under the influence.    --Dental health: Discussed importance of regular tooth brushing, flossing, and dental visits.  -- Suggested having eyes and vision checked if needed or past due.  --Immunizations reviewed.  --Discussed benefits of screening colonoscopy.        Follow Up   Return in about 1 year (around 11/5/2022), or labs, for Annual.  Patient was given instructions and counseling regarding her condition or for health maintenance advice. Please see specific information pulled into the AVS if appropriate.     [facility]  [address] [address]  Dept: [phone]  Dept Fax: [phone]  Loc: [phone]  Loc Fax: [phone]

## 2021-11-08 NOTE — PROGRESS NOTES
I, Dr. Nathanael Hart, have reviewed the notes, assessments, and/or procedures performed by Catie ROSALES, that occurred within our practice at [facility] location, I concur with her documentation of Dina Roche. I have a current collaborative medical agreement with Catie ROSALES.

## 2022-01-26 DIAGNOSIS — M54.50 ACUTE MIDLINE LOW BACK PAIN WITHOUT SCIATICA: ICD-10-CM

## 2022-01-26 RX ORDER — METHOCARBAMOL 500 MG/1
500 TABLET, FILM COATED ORAL 3 TIMES DAILY
Qty: 90 TABLET | Refills: 3 | Status: SHIPPED | OUTPATIENT
Start: 2022-01-26

## 2022-01-26 RX ORDER — MELOXICAM 15 MG/1
15 TABLET ORAL DAILY
Qty: 30 TABLET | Refills: 3 | Status: SHIPPED | OUTPATIENT
Start: 2022-01-26

## 2022-10-19 ENCOUNTER — APPOINTMENT (OUTPATIENT)
Dept: WOMENS IMAGING | Facility: HOSPITAL | Age: 57
End: 2022-10-19

## 2022-10-19 PROCEDURE — 77067 SCR MAMMO BI INCL CAD: CPT | Performed by: RADIOLOGY

## 2022-10-19 PROCEDURE — 77063 BREAST TOMOSYNTHESIS BI: CPT | Performed by: RADIOLOGY

## 2023-08-16 ENCOUNTER — TELEPHONE (OUTPATIENT)
Dept: FAMILY MEDICINE CLINIC | Facility: CLINIC | Age: 58
End: 2023-08-16

## 2023-08-16 DIAGNOSIS — T75.3XXA MOTION SICKNESS, INITIAL ENCOUNTER: Primary | ICD-10-CM

## 2023-08-16 RX ORDER — SCOLOPAMINE TRANSDERMAL SYSTEM 1 MG/1
1 PATCH, EXTENDED RELEASE TRANSDERMAL
Qty: 4 PATCH | Refills: 0 | Status: SHIPPED | OUTPATIENT
Start: 2023-08-16

## 2023-08-16 NOTE — TELEPHONE ENCOUNTER
"Caller: Dina Roche \"Mariaelena\"    Relationship to patient: Self    Best call back number: [phone]    Chief complaint    Type of visit: LABS    Requested date: FOR PHYSICAL SCHEDULED 11-27-23      If rescheduling, when is the original appointment:      Additional notes:PLEASE CALL TO SCHEDULE          "

## 2023-08-16 NOTE — TELEPHONE ENCOUNTER
Please notify patient the patch last for 72 hours.  She can ask for prescription next month or I can go ahead and send it in.  Need to know how long she is going to be on her cruise.

## 2023-08-16 NOTE — TELEPHONE ENCOUNTER
Please notify patient that I have sent the patches to pharmacy.  Please place the patch on at least 6 hours prior to getting on the ship.  Patches are good for 3 days then will need to be replaced.

## 2023-08-16 NOTE — TELEPHONE ENCOUNTER
"  Caller: Patoneda Dina H \"Mariaelena\"    Relationship: Self    Best call back number: [phone]    What medication are you requesting: MOTION SICKNESS PATCH     What are your current symptoms:     How long have you been experiencing symptoms:     Have you had these symptoms before:    [] Yes  [] No    Have you been treated for these symptoms before:   [] Yes  [] No    If a prescription is needed, what is your preferred pharmacy and phone number: [facility] [id number] [facility] [place] - [address] [facility]  AT [address] [phone] [facility] [phone]      Additional notes:  PATIENT IS TAKING A CRUISE MID OCTOBER. REQUESTS PATCH FOR THAT TRIP. PATIENT ASKS HOW LONG PATCHES LAST, SHOULD SHE WAIT TO REQUEST PRESCRIPTION A COUPLE OF WEEKS BEFORE CRUISE?  PLEASE ADVISE       "

## 2023-11-17 DIAGNOSIS — Z00.00 ANNUAL PHYSICAL EXAM: Primary | ICD-10-CM

## 2023-12-06 ENCOUNTER — OFFICE VISIT (OUTPATIENT)
Dept: FAMILY MEDICINE CLINIC | Facility: CLINIC | Age: 58
End: 2023-12-06
Payer: COMMERCIAL

## 2023-12-06 VITALS
TEMPERATURE: 98.7 F | OXYGEN SATURATION: 98 % | DIASTOLIC BLOOD PRESSURE: 80 MMHG | SYSTOLIC BLOOD PRESSURE: 118 MMHG | HEIGHT: 63 IN | BODY MASS INDEX: 21.97 KG/M2 | RESPIRATION RATE: 14 BRPM | WEIGHT: 124 LBS | HEART RATE: 73 BPM

## 2023-12-06 DIAGNOSIS — F51.01 PRIMARY INSOMNIA: ICD-10-CM

## 2023-12-06 DIAGNOSIS — T75.3XXA MOTION SICKNESS, INITIAL ENCOUNTER: ICD-10-CM

## 2023-12-06 DIAGNOSIS — Z00.00 ANNUAL PHYSICAL EXAM: Primary | ICD-10-CM

## 2023-12-06 DIAGNOSIS — E78.00 HYPERCHOLESTEREMIA: ICD-10-CM

## 2023-12-06 RX ORDER — SCOLOPAMINE TRANSDERMAL SYSTEM 1 MG/1
1 PATCH, EXTENDED RELEASE TRANSDERMAL
Qty: 4 PATCH | Refills: 0 | Status: SHIPPED | OUTPATIENT
Start: 2023-12-06

## 2023-12-06 RX ORDER — TRAZODONE HYDROCHLORIDE 50 MG/1
TABLET ORAL
Qty: 30 TABLET | Refills: 0 | Status: SHIPPED | OUTPATIENT
Start: 2023-12-06

## 2023-12-06 NOTE — PROGRESS NOTES
"Chief Complaint  Annual Exam and Hyperlipidemia (management)    Subjective          Dina Roche presents to [facility]  History of Present Illness  Leilani,\"Mariaelena\" is a 58-year-old female who presents for annual physical exam with hyperlipidemia.  Overall she is feeling well at office visit today.  States her sleep has been restless.  This has been occurring since she had children.  States that has gotten worse.  She has tried 10 mg of melatonin without relief of insomnia issues.  States she has a hard time shutting her brain down at night.  Denied any suicidal or homicidal ideation.  States she has a son graduating from the [employer] next week and will be moving to [place].  Her daughter also lives in the [facility].  States she gets very worried about them.  Bowel movements have been normal for her at least twice weekly without dark black tarry stools.  Diet has been very healthy.  She has started doing some arm weight lifting.  Denied any current fevers, chills, upper respiratory symptoms, chest pain, shortness of air, cough, wheezing, abdominal pain, GI upset or swelling of ankles.  Mariaelena will be going on a cruise in March.  Request the scopolamine patches which has helped with seasickness in the past.     Objective   Vital Signs:   /80 (BP Location: Left arm, Patient Position: Sitting, Cuff Size: Adult)   Pulse 73   Temp 98.7 °F (37.1 °C)   Resp 14   Ht 160 cm (62.99\")   Wt 56.2 kg (124 lb)   SpO2 98%   BMI 21.97 kg/m²     Physical Exam  Vitals and nursing note reviewed.   Constitutional:       Appearance: Normal appearance. She is well-developed, well-groomed and normal weight.      Interventions: Face mask in place.   HENT:      Head: Normocephalic and atraumatic.      Jaw: There is normal jaw occlusion.      Right Ear: Hearing, tympanic membrane, ear canal and external ear normal.      Left Ear: Hearing, tympanic membrane, ear canal and " external ear normal.      Nose: Nose normal.      Right Sinus: No maxillary sinus tenderness or frontal sinus tenderness.      Left Sinus: No maxillary sinus tenderness or frontal sinus tenderness.      Mouth/Throat:      Lips: Pink.      Mouth: Mucous membranes are moist.      Dentition: Normal dentition.      Tongue: No lesions.      Pharynx: Oropharynx is clear. Uvula midline.      Tonsils: No tonsillar exudate.   Eyes:      General: Lids are normal.      Conjunctiva/sclera: Conjunctivae normal.      Pupils: Pupils are equal, round, and reactive to light.   Neck:      Thyroid: No thyroid mass, thyromegaly or thyroid tenderness.      Vascular: No carotid bruit.      Trachea: Trachea and phonation normal. No tracheal tenderness.   Cardiovascular:      Rate and Rhythm: Normal rate and regular rhythm.      Pulses: Normal pulses.      Heart sounds: Normal heart sounds, S1 normal and S2 normal. No murmur heard.  Pulmonary:      Effort: Pulmonary effort is normal.      Breath sounds: Normal breath sounds and air entry.   Abdominal:      General: Bowel sounds are normal.      Palpations: Abdomen is soft.      Tenderness: There is no abdominal tenderness. There is no right CVA tenderness, left CVA tenderness, guarding or rebound. Negative signs include Block's sign, Rovsing's sign, McBurney's sign, psoas sign and obturator sign.   Musculoskeletal:      Cervical back: Neck supple.      Right lower leg: No edema.      Left lower leg: No edema.   Lymphadenopathy:      Cervical: No cervical adenopathy.      Right cervical: No superficial, deep or posterior cervical adenopathy.     Left cervical: No superficial, deep or posterior cervical adenopathy.   Skin:     General: Skin is warm and dry.      Capillary Refill: Capillary refill takes less than 2 seconds.   Neurological:      Mental Status: She is alert and oriented to person, place, and time.      Deep Tendon Reflexes:      Reflex Scores:       Patellar reflexes are 2+ on  the right side and 2+ on the left side.  Psychiatric:         Attention and Perception: Attention and perception normal.         Mood and Affect: Mood and affect normal.         Speech: Speech normal.         Behavior: Behavior is cooperative.         Thought Content: Thought content normal.         Cognition and Memory: Cognition and memory normal.         Judgment: Judgment normal.        Result Review :     CMP          11/20/2023    10:56   CMP   Glucose 89    BUN 9    Creatinine 0.92    Sodium 138    Potassium 5.0    Chloride 104    Calcium 9.6    Total Protein 7.1    Albumin 4.6    Globulin 2.5    Total Bilirubin 0.4    Alkaline Phosphatase 59    AST (SGOT) 22    ALT (SGPT) 17    BUN/Creatinine Ratio 10      CBC          11/20/2023    10:56   CBC   WBC 4.0    RBC 4.49    Hemoglobin 13.9    Hematocrit 41.6    MCV 93    MCH 31.0    MCHC 33.4    RDW 11.8    Platelets 226      Lipid Panel          11/20/2023    10:56   Lipid Panel   Total Cholesterol 180    Triglycerides 78    HDL Cholesterol 82    VLDL Cholesterol 14    LDL Cholesterol  84    LDL/HDL Ratio 1.0      TSH          11/20/2023    10:56   TSH   TSH 2.430                Assessment and Plan    Diagnoses and all orders for this visit:    1. Annual physical exam (Primary)    2. Hypercholesteremia    3. Motion sickness, initial encounter  -     Scopolamine 1 MG/3DAYS patch; Place 1 patch on the skin as directed by provider Every 72 (Seventy-Two) Hours.  Dispense: 4 patch; Refill: 0    4. Primary insomnia  -     traZODone (DESYREL) 50 MG tablet; Take 0.5 tablet at bedtime  Dispense: 30 tablet; Refill: 0    1.  Annual physical exam with hypercholesteremia: Have reviewed above blood work with her today.  Cholesterol and sugar readings are in normal range.  Will recollect fasting lab work in 1 year.  2.  New motion sickness: She is due to go on a cruise in March and would like scopolamine patches.  I have sent this to pharmacy.  She was instructed on how to use  the medication.  3.  New primary insomnia: We will try trazodone half a tablet 1 hour before bed to see if this helps with her insomnia issues.  Update status in 2 weeks.    Patient Counseling:  --Nutrition: Stressed importance of moderation in sodium/caffeine intake, saturated fat and cholesterol.  Discussed caloric balance, sufficient intake of fresh fruits, vegetables, fiber,   calcium, iron.  --Discussed the new recommendation against daily use of baby aspirin for primary prevention in low risk patients.  --Exercise: Stressed the importance of regular exercise by incorporating into daily routine.    --Substance Abuse: Discussed cessation/primary prevention of tobacco, alcohol, or other drug use; driving or other dangerous activities under the influence.    --Dental health: Discussed importance of regular tooth brushing, flossing, and dental visits.  -- Suggested having eyes and vision checked if needed or past due.  --Immunizations reviewed.  --Discussed benefits of screening colonoscopy.      Follow Up   Return in about 1 year (around 12/6/2024), or labs prior, for Annual.  Patient was given instructions and counseling regarding her condition or for health maintenance advice. Please see specific information pulled into the AVS if appropriate.     [facility]YDA [facility]  [address] [place] [id number]  Dept: [phone]  Dept Fax: [phone]  Loc: [phone]  Loc Fax: [phone]

## 2024-04-24 DIAGNOSIS — M54.50 ACUTE MIDLINE LOW BACK PAIN WITHOUT SCIATICA: ICD-10-CM

## 2024-04-24 RX ORDER — METHOCARBAMOL 500 MG/1
500 TABLET, FILM COATED ORAL 3 TIMES DAILY
Qty: 90 TABLET | Refills: 2 | Status: SHIPPED | OUTPATIENT
Start: 2024-04-24

## 2024-04-24 RX ORDER — MELOXICAM 15 MG/1
15 TABLET ORAL DAILY
Qty: 30 TABLET | Refills: 2 | Status: SHIPPED | OUTPATIENT
Start: 2024-04-24